# Patient Record
Sex: FEMALE | Race: WHITE | NOT HISPANIC OR LATINO | Employment: OTHER | URBAN - METROPOLITAN AREA
[De-identification: names, ages, dates, MRNs, and addresses within clinical notes are randomized per-mention and may not be internally consistent; named-entity substitution may affect disease eponyms.]

---

## 2021-11-19 ENCOUNTER — HISTORICAL (OUTPATIENT)
Dept: ADMINISTRATIVE | Facility: HOSPITAL | Age: 51
End: 2021-11-19

## 2021-11-19 LAB
ABS NEUT (OLG): 4.81 X10(3)/MCL (ref 2.1–9.2)
ALBUMIN SERPL-MCNC: 4 GM/DL (ref 3.5–5)
ALBUMIN/GLOB SERPL: 1.1 RATIO (ref 1.1–2)
ALP SERPL-CCNC: 95 UNIT/L (ref 40–150)
ALT SERPL-CCNC: 21 UNIT/L (ref 0–55)
APPEARANCE, UA: CLEAR
AST SERPL-CCNC: 21 UNIT/L (ref 5–34)
BACTERIA SPEC CULT: NORMAL /HPF
BASOPHILS # BLD AUTO: 0 X10(3)/MCL (ref 0–0.2)
BASOPHILS NFR BLD AUTO: 1 %
BILIRUB SERPL-MCNC: 1.4 MG/DL
BILIRUB UR QL STRIP: NEGATIVE
BILIRUBIN DIRECT+TOT PNL SERPL-MCNC: 0.5 MG/DL (ref 0–0.5)
BILIRUBIN DIRECT+TOT PNL SERPL-MCNC: 0.9 MG/DL (ref 0–0.8)
BNP BLD-MCNC: 159.9 PG/ML
BUN SERPL-MCNC: 17 MG/DL (ref 9.8–20.1)
CALCIUM SERPL-MCNC: 9.4 MG/DL (ref 8.7–10.5)
CHLORIDE SERPL-SCNC: 105 MMOL/L (ref 98–107)
CHOLEST SERPL-MCNC: 176 MG/DL
CHOLEST/HDLC SERPL: 4 {RATIO} (ref 0–5)
CO2 SERPL-SCNC: 29 MMOL/L (ref 22–29)
COLOR UR: YELLOW
CREAT SERPL-MCNC: 0.92 MG/DL (ref 0.55–1.02)
EOSINOPHIL # BLD AUTO: 0.4 X10(3)/MCL (ref 0–0.9)
EOSINOPHIL NFR BLD AUTO: 5 %
ERYTHROCYTE [DISTWIDTH] IN BLOOD BY AUTOMATED COUNT: 14.7 % (ref 11.5–17)
EST. AVERAGE GLUCOSE BLD GHB EST-MCNC: 154.2 MG/DL
GLOBULIN SER-MCNC: 3.7 GM/DL (ref 2.4–3.5)
GLUCOSE (UA): NEGATIVE
GLUCOSE SERPL-MCNC: 143 MG/DL (ref 74–100)
HBA1C MFR BLD: 7 %
HCT VFR BLD AUTO: 49.3 % (ref 37–47)
HDLC SERPL-MCNC: 46 MG/DL (ref 35–60)
HGB BLD-MCNC: 15.3 GM/DL (ref 12–16)
HGB UR QL STRIP: NEGATIVE
KETONES UR QL STRIP: NEGATIVE
LDLC SERPL CALC-MCNC: 104 MG/DL (ref 50–140)
LEUKOCYTE ESTERASE UR QL STRIP: NEGATIVE
LYMPHOCYTES # BLD AUTO: 1.5 X10(3)/MCL (ref 0.6–4.6)
LYMPHOCYTES NFR BLD AUTO: 22 %
MCH RBC QN AUTO: 30.1 PG (ref 27–31)
MCHC RBC AUTO-ENTMCNC: 31 GM/DL (ref 33–36)
MCV RBC AUTO: 96.9 FL (ref 80–94)
MONOCYTES # BLD AUTO: 0.4 X10(3)/MCL (ref 0.1–1.3)
MONOCYTES NFR BLD AUTO: 5 %
NEUTROPHILS # BLD AUTO: 4.81 X10(3)/MCL (ref 2.1–9.2)
NEUTROPHILS NFR BLD AUTO: 67 %
NITRITE UR QL STRIP: NEGATIVE
PH UR STRIP: 7 [PH] (ref 5–9)
PLATELET # BLD AUTO: 265 X10(3)/MCL (ref 130–400)
PMV BLD AUTO: 10.2 FL (ref 9.4–12.4)
POTASSIUM SERPL-SCNC: 4.7 MMOL/L (ref 3.5–5.1)
PROT SERPL-MCNC: 7.7 GM/DL (ref 6.4–8.3)
PROT UR QL STRIP: NEGATIVE
RBC # BLD AUTO: 5.09 X10(6)/MCL (ref 4.2–5.4)
RBC #/AREA URNS HPF: NORMAL /[HPF]
SODIUM SERPL-SCNC: 143 MMOL/L (ref 136–145)
SP GR UR STRIP: 1.01 (ref 1–1.03)
SQUAMOUS EPITHELIAL, UA: NORMAL /HPF (ref 0–4)
TRIGL SERPL-MCNC: 131 MG/DL (ref 37–140)
TSH SERPL-ACNC: 3.17 UIU/ML (ref 0.35–4.94)
UROBILINOGEN UR STRIP-ACNC: 0.2
VLDLC SERPL CALC-MCNC: 26 MG/DL
WBC # SPEC AUTO: 7.2 X10(3)/MCL (ref 4.5–11.5)
WBC #/AREA URNS HPF: NORMAL /[HPF]

## 2022-03-17 ENCOUNTER — HISTORICAL (OUTPATIENT)
Dept: ADMINISTRATIVE | Facility: HOSPITAL | Age: 52
End: 2022-03-17

## 2022-03-18 ENCOUNTER — HISTORICAL (OUTPATIENT)
Dept: RADIOLOGY | Facility: HOSPITAL | Age: 52
End: 2022-03-18

## 2022-03-18 ENCOUNTER — HISTORICAL (OUTPATIENT)
Dept: ADMINISTRATIVE | Facility: HOSPITAL | Age: 52
End: 2022-03-18

## 2022-03-21 ENCOUNTER — HISTORICAL (OUTPATIENT)
Dept: ADMINISTRATIVE | Facility: HOSPITAL | Age: 52
End: 2022-03-21

## 2022-03-21 LAB
ABS NEUT (OLG): 4.43 (ref 2.1–9.2)
ALBUMIN SERPL-MCNC: 3.9 G/DL (ref 3.5–5)
ALBUMIN/GLOB SERPL: 1 {RATIO} (ref 1.1–2)
ALP SERPL-CCNC: 87 U/L (ref 40–150)
ALT SERPL-CCNC: 21 U/L (ref 0–55)
APPEARANCE, UA: CLEAR
AST SERPL-CCNC: 19 U/L (ref 5–34)
BACTERIA SPEC CULT: NORMAL
BASOPHILS # BLD AUTO: 0 10*3/UL (ref 0–0.2)
BASOPHILS NFR BLD AUTO: 1 %
BILIRUB SERPL-MCNC: 1 MG/DL
BILIRUB UR QL STRIP: NEGATIVE
BILIRUBIN DIRECT+TOT PNL SERPL-MCNC: 0.3 (ref 0–0.5)
BILIRUBIN DIRECT+TOT PNL SERPL-MCNC: 0.7 (ref 0–0.8)
BUDDING YEAST: NORMAL
BUN SERPL-MCNC: 18 MG/DL (ref 9.8–20.1)
CALCIUM SERPL-MCNC: 9.1 MG/DL (ref 8.7–10.5)
CASTS, UA: NORMAL
CHLORIDE SERPL-SCNC: 105 MMOL/L (ref 98–107)
CO2 SERPL-SCNC: 24 MMOL/L (ref 22–29)
COLOR UR: YELLOW
CREAT SERPL-MCNC: 1 MG/DL (ref 0.55–1.02)
CREAT UR-MCNC: 15.2 MG/DL (ref 45–106)
CRYSTALS: NORMAL
EOSINOPHIL # BLD AUTO: 0.5 10*3/UL (ref 0–0.9)
EOSINOPHIL NFR BLD AUTO: 7 %
ERYTHROCYTE [DISTWIDTH] IN BLOOD BY AUTOMATED COUNT: 14.6 % (ref 11.5–17)
EST. AVERAGE GLUCOSE BLD GHB EST-MCNC: 174.3 MG/DL
GLOBULIN SER-MCNC: 3.8 G/DL (ref 2.4–3.5)
GLUCOSE (UA): NEGATIVE
GLUCOSE SERPL-MCNC: 143 MG/DL (ref 74–100)
HBA1C MFR BLD: 7.7 %
HCT VFR BLD AUTO: 47.1 % (ref 37–47)
HEMOLYSIS INTERF INDEX SERPL-ACNC: 6
HGB BLD-MCNC: 14.9 G/DL (ref 12–16)
HGB UR QL STRIP: NEGATIVE
ICTERIC INTERF INDEX SERPL-ACNC: 1
KETONES UR QL STRIP: NEGATIVE
LEUKOCYTE ESTERASE UR QL STRIP: NORMAL
LIPEMIC INTERF INDEX SERPL-ACNC: 3
LYMPHOCYTES # BLD AUTO: 2 10*3/UL (ref 0.6–4.6)
LYMPHOCYTES NFR BLD AUTO: 26 %
MANUAL DIFF? (OHS): NO
MCH RBC QN AUTO: 30.7 PG (ref 27–31)
MCHC RBC AUTO-ENTMCNC: 31.6 G/DL (ref 33–36)
MCV RBC AUTO: 97.1 FL (ref 80–94)
MICROALBUMIN UR-MCNC: <5
MICROALBUMIN/CREAT RATIO PNL UR: <32.9 (ref 0–30)
MONOCYTES # BLD AUTO: 0.5 10*3/UL (ref 0.1–1.3)
MONOCYTES NFR BLD AUTO: 6 %
NEUTROPHILS # BLD AUTO: 4.43 10*3/UL (ref 2.1–9.2)
NEUTROPHILS NFR BLD AUTO: 59 %
NITRITE UR QL STRIP: NEGATIVE
PH UR STRIP: 5 [PH] (ref 5–9)
PLATELET # BLD AUTO: 286 10*3/UL (ref 130–400)
PMV BLD AUTO: 10.6 FL (ref 9.4–12.4)
POTASSIUM SERPL-SCNC: 4.4 MMOL/L (ref 3.5–5.1)
PROT SERPL-MCNC: 7.7 G/DL (ref 6.4–8.3)
PROT UR QL STRIP: NEGATIVE
RBC # BLD AUTO: 4.85 10*6/UL (ref 4.2–5.4)
RBC #/AREA URNS HPF: NORMAL /[HPF] (ref 0–2)
SMALL ROUND CELLS, UA: NORMAL
SODIUM SERPL-SCNC: 138 MMOL/L (ref 136–145)
SP GR UR STRIP: 1.01 (ref 1–1.03)
SPERM URNS QL MICRO: NORMAL
SQUAMOUS EPITHELIAL, UA: NORMAL (ref 0–4)
UROBILINOGEN UR STRIP-ACNC: 0.2
WBC # SPEC AUTO: 7.5 10*3/UL (ref 4.5–11.5)
WBC #/AREA URNS HPF: NORMAL /[HPF] (ref 0–2)

## 2022-04-11 ENCOUNTER — HISTORICAL (OUTPATIENT)
Dept: ADMINISTRATIVE | Facility: HOSPITAL | Age: 52
End: 2022-04-11
Payer: MEDICAID

## 2022-04-27 VITALS
WEIGHT: 201.75 LBS | OXYGEN SATURATION: 94 % | BODY MASS INDEX: 37.13 KG/M2 | HEIGHT: 62 IN | DIASTOLIC BLOOD PRESSURE: 108 MMHG | SYSTOLIC BLOOD PRESSURE: 152 MMHG

## 2022-09-13 DIAGNOSIS — R91.1 LUNG NODULE: ICD-10-CM

## 2022-09-13 DIAGNOSIS — R59.0 HILAR LYMPHADENOPATHY: ICD-10-CM

## 2022-09-13 DIAGNOSIS — R91.1 SOLITARY PULMONARY NODULE: Primary | ICD-10-CM

## 2022-09-13 DIAGNOSIS — R59.0 MEDIASTINAL LYMPHADENOPATHY: ICD-10-CM

## 2022-09-19 ENCOUNTER — DOCUMENTATION ONLY (OUTPATIENT)
Dept: PULMONOLOGY | Facility: CLINIC | Age: 52
End: 2022-09-19
Payer: COMMERCIAL

## 2022-09-19 DIAGNOSIS — R59.0 MEDIASTINAL LYMPHADENOPATHY: Primary | ICD-10-CM

## 2022-09-19 DIAGNOSIS — R91.1 LUNG NODULE: ICD-10-CM

## 2022-09-19 DIAGNOSIS — R59.0 HILAR LYMPHADENOPATHY: ICD-10-CM

## 2022-09-19 DIAGNOSIS — R91.1 SOLITARY PULMONARY NODULE: ICD-10-CM

## 2022-09-19 NOTE — PROGRESS NOTES
Chest CT with contrast: CPT-33672  ICD 10-R59.9/R91.8  Prior Auth #[id number]  Valid:09/19/2022-10/18/2022    Notified [facility] PA Team of PA status.

## 2022-09-29 ENCOUNTER — HOSPITAL ENCOUNTER (OUTPATIENT)
Dept: RADIOLOGY | Facility: HOSPITAL | Age: 52
Discharge: HOME | End: 2022-09-29
Attending: INTERNAL MEDICINE
Payer: COMMERCIAL

## 2022-09-29 ENCOUNTER — TELEPHONE (OUTPATIENT)
Dept: PULMONOLOGY | Facility: CLINIC | Age: 52
End: 2022-09-29
Payer: COMMERCIAL

## 2022-09-29 DIAGNOSIS — R91.1 LUNG NODULE: ICD-10-CM

## 2022-09-29 DIAGNOSIS — R59.0 HILAR LYMPHADENOPATHY: ICD-10-CM

## 2022-09-29 DIAGNOSIS — R91.1 SOLITARY PULMONARY NODULE: ICD-10-CM

## 2022-09-29 DIAGNOSIS — R59.0 MEDIASTINAL LYMPHADENOPATHY: ICD-10-CM

## 2022-09-29 LAB
CREAT SERPL-MCNC: 1.16 MG/DL (ref 0.55–1.02)
GFR SERPLBLD CREATININE-BSD FMLA CKD-EPI: 57 MLS/MIN/1.73/M2

## 2022-09-29 PROCEDURE — 25500020 PHARM REV CODE 255

## 2022-09-29 PROCEDURE — 71260 CT THORAX DX C+: CPT | Mod: TC

## 2022-09-29 PROCEDURE — 82565 ASSAY OF CREATININE: CPT | Performed by: INTERNAL MEDICINE

## 2022-09-29 PROCEDURE — 36415 COLL VENOUS BLD VENIPUNCTURE: CPT | Performed by: INTERNAL MEDICINE

## 2022-09-29 RX ADMIN — IOPAMIDOL 100 ML: 755 INJECTION, SOLUTION INTRAVENOUS at 08:09

## 2022-09-30 RX ORDER — POTASSIUM CHLORIDE 20 MEQ/1
20 TABLET, EXTENDED RELEASE ORAL DAILY
COMMUNITY
End: 2022-10-10 | Stop reason: ALTCHOICE

## 2022-09-30 RX ORDER — ATORVASTATIN CALCIUM 20 MG/1
20 TABLET, FILM COATED ORAL DAILY
COMMUNITY
Start: 2021-12-02 | End: 2022-10-10 | Stop reason: SDUPTHER

## 2022-09-30 RX ORDER — FLUOXETINE 10 MG/1
10 CAPSULE ORAL DAILY
COMMUNITY
End: 2022-10-31 | Stop reason: SDUPTHER

## 2022-09-30 RX ORDER — METFORMIN HYDROCHLORIDE 500 MG/1
2 TABLET ORAL 2 TIMES DAILY WITH MEALS
COMMUNITY
End: 2022-10-10 | Stop reason: ALTCHOICE

## 2022-09-30 RX ORDER — ASPIRIN 81 MG/1
81 TABLET ORAL DAILY
COMMUNITY
Start: 2021-12-02

## 2022-09-30 RX ORDER — CARVEDILOL 6.25 MG/1
25 TABLET ORAL 2 TIMES DAILY
COMMUNITY
Start: 2021-11-18 | End: 2022-10-10 | Stop reason: SDUPTHER

## 2022-09-30 RX ORDER — FLUTICASONE PROPIONATE 50 UG/1
1 POWDER, METERED RESPIRATORY (INHALATION) DAILY
COMMUNITY
End: 2022-10-12

## 2022-09-30 RX ORDER — ALBUTEROL SULFATE 90 UG/1
2 AEROSOL, METERED RESPIRATORY (INHALATION) EVERY 4 HOURS PRN
COMMUNITY

## 2022-09-30 RX ORDER — LOSARTAN POTASSIUM 100 MG/1
100 TABLET ORAL DAILY
COMMUNITY
Start: 2021-12-02 | End: 2022-10-10 | Stop reason: ALTCHOICE

## 2022-09-30 RX ORDER — FUROSEMIDE 20 MG/1
20 TABLET ORAL DAILY
COMMUNITY
Start: 2021-10-18 | End: 2022-10-10 | Stop reason: SDUPTHER

## 2022-09-30 RX ORDER — BUDESONIDE AND FORMOTEROL FUMARATE DIHYDRATE 80; 4.5 UG/1; UG/1
2 AEROSOL RESPIRATORY (INHALATION) 2 TIMES DAILY
COMMUNITY
End: 2022-10-12

## 2022-10-03 ENCOUNTER — OFFICE VISIT (OUTPATIENT)
Dept: PULMONOLOGY | Facility: CLINIC | Age: 52
End: 2022-10-03
Payer: COMMERCIAL

## 2022-10-03 ENCOUNTER — TELEPHONE (OUTPATIENT)
Dept: PULMONOLOGY | Facility: CLINIC | Age: 52
End: 2022-10-03

## 2022-10-03 VITALS
OXYGEN SATURATION: 92 % | WEIGHT: 207 LBS | BODY MASS INDEX: 38.09 KG/M2 | HEART RATE: 69 BPM | RESPIRATION RATE: 20 BRPM | DIASTOLIC BLOOD PRESSURE: 74 MMHG | HEIGHT: 62 IN | TEMPERATURE: 98 F | SYSTOLIC BLOOD PRESSURE: 122 MMHG

## 2022-10-03 DIAGNOSIS — R91.8 PULMONARY NODULES: Primary | ICD-10-CM

## 2022-10-03 DIAGNOSIS — R59.0 HILAR LYMPHADENOPATHY: ICD-10-CM

## 2022-10-03 DIAGNOSIS — R59.0 MEDIASTINAL LYMPHADENOPATHY: ICD-10-CM

## 2022-10-03 DIAGNOSIS — R91.1 SOLITARY PULMONARY NODULE: ICD-10-CM

## 2022-10-03 PROCEDURE — 3074F SYST BP LT 130 MM HG: CPT | Mod: CPTII,,, | Performed by: INTERNAL MEDICINE

## 2022-10-03 PROCEDURE — 1159F MED LIST DOCD IN RCRD: CPT | Mod: CPTII,,, | Performed by: INTERNAL MEDICINE

## 2022-10-03 PROCEDURE — 99213 PR OFFICE/OUTPT VISIT, EST, LEVL III, 20-29 MIN: ICD-10-PCS | Mod: S$PBB,,, | Performed by: INTERNAL MEDICINE

## 2022-10-03 PROCEDURE — 3052F PR MOST RECENT HEMOGLOBIN A1C LEVEL 8.0 - < 9.0%: ICD-10-PCS | Mod: CPTII,,, | Performed by: INTERNAL MEDICINE

## 2022-10-03 PROCEDURE — 3008F PR BODY MASS INDEX (BMI) DOCUMENTED: ICD-10-PCS | Mod: CPTII,,, | Performed by: INTERNAL MEDICINE

## 2022-10-03 PROCEDURE — 3008F BODY MASS INDEX DOCD: CPT | Mod: CPTII,,, | Performed by: INTERNAL MEDICINE

## 2022-10-03 PROCEDURE — 4010F ACE/ARB THERAPY RXD/TAKEN: CPT | Mod: CPTII,,, | Performed by: INTERNAL MEDICINE

## 2022-10-03 PROCEDURE — 4010F PR ACE/ARB THEARPY RXD/TAKEN: ICD-10-PCS | Mod: CPTII,,, | Performed by: INTERNAL MEDICINE

## 2022-10-03 PROCEDURE — 3074F PR MOST RECENT SYSTOLIC BLOOD PRESSURE < 130 MM HG: ICD-10-PCS | Mod: CPTII,,, | Performed by: INTERNAL MEDICINE

## 2022-10-03 PROCEDURE — 3078F PR MOST RECENT DIASTOLIC BLOOD PRESSURE < 80 MM HG: ICD-10-PCS | Mod: CPTII,,, | Performed by: INTERNAL MEDICINE

## 2022-10-03 PROCEDURE — 1160F PR REVIEW ALL MEDS BY PRESCRIBER/CLIN PHARMACIST DOCUMENTED: ICD-10-PCS | Mod: CPTII,,, | Performed by: INTERNAL MEDICINE

## 2022-10-03 PROCEDURE — 3078F DIAST BP <80 MM HG: CPT | Mod: CPTII,,, | Performed by: INTERNAL MEDICINE

## 2022-10-03 PROCEDURE — 3066F PR DOCUMENTATION OF TREATMENT FOR NEPHROPATHY: ICD-10-PCS | Mod: CPTII,,, | Performed by: INTERNAL MEDICINE

## 2022-10-03 PROCEDURE — 3061F NEG MICROALBUMINURIA REV: CPT | Mod: CPTII,,, | Performed by: INTERNAL MEDICINE

## 2022-10-03 PROCEDURE — 99213 OFFICE O/P EST LOW 20 MIN: CPT | Mod: S$PBB,,, | Performed by: INTERNAL MEDICINE

## 2022-10-03 PROCEDURE — 99214 OFFICE O/P EST MOD 30 MIN: CPT | Mod: PBBFAC

## 2022-10-03 PROCEDURE — 3052F HG A1C>EQUAL 8.0%<EQUAL 9.0%: CPT | Mod: CPTII,,, | Performed by: INTERNAL MEDICINE

## 2022-10-03 PROCEDURE — 1160F RVW MEDS BY RX/DR IN RCRD: CPT | Mod: CPTII,,, | Performed by: INTERNAL MEDICINE

## 2022-10-03 PROCEDURE — 1159F PR MEDICATION LIST DOCUMENTED IN MEDICAL RECORD: ICD-10-PCS | Mod: CPTII,,, | Performed by: INTERNAL MEDICINE

## 2022-10-03 PROCEDURE — 3061F PR NEG MICROALBUMINURIA RESULT DOCUMENTED/REVIEW: ICD-10-PCS | Mod: CPTII,,, | Performed by: INTERNAL MEDICINE

## 2022-10-03 PROCEDURE — 3066F NEPHROPATHY DOC TX: CPT | Mod: CPTII,,, | Performed by: INTERNAL MEDICINE

## 2022-10-03 RX ORDER — CARVEDILOL 25 MG/1
25 TABLET ORAL 2 TIMES DAILY WITH MEALS
COMMUNITY
End: 2022-10-10 | Stop reason: SDUPTHER

## 2022-10-03 RX ORDER — DAPAGLIFLOZIN 10 MG/1
10 TABLET, FILM COATED ORAL DAILY
COMMUNITY
End: 2022-10-10 | Stop reason: ALTCHOICE

## 2022-10-03 RX ORDER — AMLODIPINE BESYLATE 5 MG/1
5 TABLET ORAL DAILY
COMMUNITY
End: 2022-10-10 | Stop reason: SDUPTHER

## 2022-10-03 RX ORDER — PANTOPRAZOLE SODIUM 20 MG/1
20 TABLET, DELAYED RELEASE ORAL DAILY
COMMUNITY
End: 2022-10-10 | Stop reason: SDUPTHER

## 2022-10-03 RX ORDER — OLMESARTAN MEDOXOMIL 20 MG/1
40 TABLET ORAL DAILY
COMMUNITY
End: 2022-10-10 | Stop reason: SDUPTHER

## 2022-10-03 RX ORDER — GLIPIZIDE 5 MG/1
5 TABLET, FILM COATED, EXTENDED RELEASE ORAL
COMMUNITY
End: 2022-10-10 | Stop reason: ALTCHOICE

## 2022-10-03 NOTE — PROGRESS NOTES
"Subjective:       Patient ID: Kirstie Christianson is a 51 y.o. female.    Chief Complaint: No chief complaint on file.    HPI  51 y.o. F here for initial evaluation in [facility] for lung nodules. Imaging from 9/22 shows 5mm nodule and additional sub-centimeter nodules(negligible in size compared to 5mm).  Patient had prior imaging in March which had shown a similar finding.  Scans also incidentally noted some ground-glass opacities scattered about which looked more like edema and this is consistent with her history of heart failure.  There was some trace lymphadenopathy but that is also unchanged.  Patient does complain of dyspnea on exertion and orthopnea which is highly likely to represent heart failure symptoms.  Objective:   /74 (BP Location: Left arm, Patient Position: Sitting, BP Method: Large (Automatic))   Pulse 69   Temp 98.4 °F (36.9 °C)   Resp 20   Ht 5' 2" (1.575 m)   Wt 93.9 kg (207 lb)   SpO2 (!) 92% Comment: on room air  BMI 37.86 kg/m²     GENERAL: NAD, A & 0 X 3, calm  CARDIAC: RRR, NO m/g/r  PULM: CTA BL, No wheezing.  Faint rales heard in the bases  ABD: NT/ND/S. .  Bowel sounds heard  EXT:  Mild cyanosis noted, no clubbing, there is trace edema, peripheral pulses intact  NEURO: no obvious neurosensory deficits, no dysarthria noted  PSYCH: no agitation or anxiety   EYES: tracks examiner around room, pupil reactive to light  NECK: trachea midline, no obvious JVD    ROS:  General: no fevers or chills reported  Endocrine: denies polyuria or polydipsia. Denies weight loss  Card:denies CP or palpitations. Denies jaw pain  Neuro: denies hemiparesis or facial droop. Denies dysarthia  Ext: no cyanosis, clubbing, or edema  Resp: see HPI  Eyes: denies discharge, changes in vision, or pain with eye movements  Psychiatric: denies suicidal thoughts or depression  GI: denies nausea, vomiting, or diarhea    Current Outpatient Medications   Medication Instructions    albuterol (PROVENTIL/VENTOLIN HFA) 90 " mcg/actuation inhaler 2 puffs, Inhalation, Every 4 hours PRN, Rescue    amLODIPine (NORVASC) 5 mg, Oral, Daily    aspirin (ECOTRIN) 81 mg, Oral, Daily    atorvastatin (LIPITOR) 20 mg, Oral, Daily    budesonide-formoterol 80-4.5 mcg (SYMBICORT) 80-4.5 mcg/actuation HFAA 2 puffs, Inhalation, 2 times daily, Controller    carvediloL (COREG) 25 mg, Oral, 2 times daily    carvediloL (COREG) 25 mg, Oral, 2 times daily with meals    FARXIGA 10 mg, Oral, Daily    FLUoxetine 10 mg, Oral, Daily    fluticasone propionate (FLOVENT DISKUS) 50 mcg/actuation DsDv 1 spray, Inhalation, Daily, One spray each nare daily    furosemide (LASIX) 20 mg, Oral, Daily    glipiZIDE 5 mg, Oral, With breakfast    losartan (COZAAR) 100 mg, Oral, Daily    metFORMIN (GLUCOPHAGE) 500 MG tablet 2 tablets, Oral, 2 times daily with meals, Take two tablets twice daily    olmesartan (BENICAR) 40 mg, Oral, Daily    pantoprazole (PROTONIX) 20 mg, Oral, Daily    potassium chloride SA (K-DUR,KLOR-CON, K-TAB) 20 MEQ tablet 20 mEq, Oral, Daily   CLINICAL HISTORY:  Lung nodule, < 6mm, high cancer risk;Mediastinal and hilar lymphadenopathy; Localized enlarged lymph nodes     TECHNIQUE:  Low dose axial images, sagittal and coronal reformations were obtained from the thoracic inlet to the lung bases. Contrast was  administered.  Automatic exposure control is utilized to reduce patient radiation exposure.     COMPARISON:  03/17/2022     FINDINGS:  There is some ground-glass interstitial infiltrates seen in the lungs in the upper and lower lobes.  There is a nodule seen along the major fissure on the right side which is stable since the prior examination.  It measures 5 mm.  No pleural effusion is seen.  No consolidation is seen.  No enhancing lesion is seen.  There is some subcentimeter lymph nodes in the mediastinum which were seen on the prior examination as well and appear unchanged.  Thoracic aorta appears normal.  Heart appears normal.     Upper abdomen shows  evidence of hepatomegaly and hepatic steatosis.     Impression:     Stable nodule along the major fissure on the right side     Interval development of diffuse ground-glass interstitial infiltrates with no consolidation     Stable mediastinal lymphadenopathy most likely reactive     Hepatic steatosis and hepatomegaly       Assessment:       Pulmonary nodules      Plan:     The dominant nodule in question on the right measures between 5 and 6 mm and has been radiographically stable for 6 months.  I believe that this represents a benign process as she is low risk however it still deserves subsequent follow-up.  We will obtain a repeat CT scan in 1 year and have her follow-up than    Ilir Herrera MD

## 2022-10-03 NOTE — TELEPHONE ENCOUNTER
I called Ms. Christianson's sister and let her know the date and time of her appt with Dr. Brambila to get established.  She verbalized understanding/tdr

## 2022-10-10 ENCOUNTER — OFFICE VISIT (OUTPATIENT)
Dept: PRIMARY CARE CLINIC | Facility: CLINIC | Age: 52
End: 2022-10-10
Payer: COMMERCIAL

## 2022-10-10 VITALS
SYSTOLIC BLOOD PRESSURE: 136 MMHG | WEIGHT: 211 LBS | RESPIRATION RATE: 20 BRPM | HEART RATE: 68 BPM | HEIGHT: 62 IN | BODY MASS INDEX: 38.83 KG/M2 | TEMPERATURE: 98 F | DIASTOLIC BLOOD PRESSURE: 78 MMHG | OXYGEN SATURATION: 91 %

## 2022-10-10 DIAGNOSIS — F41.1 GAD (GENERALIZED ANXIETY DISORDER): ICD-10-CM

## 2022-10-10 DIAGNOSIS — M54.16 LUMBAR BACK PAIN WITH RADICULOPATHY AFFECTING LOWER EXTREMITY: ICD-10-CM

## 2022-10-10 DIAGNOSIS — E66.01 CLASS 2 SEVERE OBESITY WITH SERIOUS COMORBIDITY AND BODY MASS INDEX (BMI) OF 37.0 TO 37.9 IN ADULT, UNSPECIFIED OBESITY TYPE: ICD-10-CM

## 2022-10-10 DIAGNOSIS — I10 PRIMARY HYPERTENSION: ICD-10-CM

## 2022-10-10 DIAGNOSIS — E11.59 TYPE 2 DIABETES MELLITUS WITH OTHER CIRCULATORY COMPLICATION, WITHOUT LONG-TERM CURRENT USE OF INSULIN: ICD-10-CM

## 2022-10-10 DIAGNOSIS — K21.9 GASTROESOPHAGEAL REFLUX DISEASE, UNSPECIFIED WHETHER ESOPHAGITIS PRESENT: ICD-10-CM

## 2022-10-10 DIAGNOSIS — J30.2 SEASONAL ALLERGIC RHINITIS, UNSPECIFIED TRIGGER: ICD-10-CM

## 2022-10-10 DIAGNOSIS — E11.43 TYPE II DIABETES MELLITUS WITH PERIPHERAL AUTONOMIC NEUROPATHY: ICD-10-CM

## 2022-10-10 DIAGNOSIS — E78.2 MIXED HYPERLIPIDEMIA: ICD-10-CM

## 2022-10-10 DIAGNOSIS — I50.20 HEART FAILURE WITH REDUCED EJECTION FRACTION: ICD-10-CM

## 2022-10-10 DIAGNOSIS — Z76.89 ENCOUNTER TO ESTABLISH CARE WITH NEW DOCTOR: Primary | ICD-10-CM

## 2022-10-10 PROBLEM — E66.812 CLASS 2 SEVERE OBESITY WITH SERIOUS COMORBIDITY AND BODY MASS INDEX (BMI) OF 37.0 TO 37.9 IN ADULT: Status: ACTIVE | Noted: 2022-10-10

## 2022-10-10 PROBLEM — E11.3299 MILD NONPROLIFERATIVE DIABETIC RETINOPATHY WITHOUT MACULAR EDEMA ASSOCIATED WITH TYPE 2 DIABETES MELLITUS: Status: ACTIVE | Noted: 2022-10-10

## 2022-10-10 PROBLEM — E11.65 TYPE 2 DIABETES MELLITUS WITH HYPERGLYCEMIA, WITHOUT LONG-TERM CURRENT USE OF INSULIN: Status: ACTIVE | Noted: 2022-10-10

## 2022-10-10 PROBLEM — I50.9 ACC/AHA STAGE C CONGESTIVE HEART FAILURE: Status: ACTIVE | Noted: 2022-10-10

## 2022-10-10 PROBLEM — R91.1 PULMONARY NODULE: Status: ACTIVE | Noted: 2022-10-10

## 2022-10-10 PROBLEM — E66.9 OBESITY: Status: ACTIVE | Noted: 2022-10-10

## 2022-10-10 PROCEDURE — 4010F PR ACE/ARB THEARPY RXD/TAKEN: ICD-10-PCS | Mod: CPTII,,, | Performed by: STUDENT IN AN ORGANIZED HEALTH CARE EDUCATION/TRAINING PROGRAM

## 2022-10-10 PROCEDURE — 3008F PR BODY MASS INDEX (BMI) DOCUMENTED: ICD-10-PCS | Mod: CPTII,,, | Performed by: STUDENT IN AN ORGANIZED HEALTH CARE EDUCATION/TRAINING PROGRAM

## 2022-10-10 PROCEDURE — 3061F NEG MICROALBUMINURIA REV: CPT | Mod: CPTII,,, | Performed by: STUDENT IN AN ORGANIZED HEALTH CARE EDUCATION/TRAINING PROGRAM

## 2022-10-10 PROCEDURE — 3075F PR MOST RECENT SYSTOLIC BLOOD PRESS GE 130-139MM HG: ICD-10-PCS | Mod: CPTII,,, | Performed by: STUDENT IN AN ORGANIZED HEALTH CARE EDUCATION/TRAINING PROGRAM

## 2022-10-10 PROCEDURE — 1160F PR REVIEW ALL MEDS BY PRESCRIBER/CLIN PHARMACIST DOCUMENTED: ICD-10-PCS | Mod: CPTII,,, | Performed by: STUDENT IN AN ORGANIZED HEALTH CARE EDUCATION/TRAINING PROGRAM

## 2022-10-10 PROCEDURE — 99417 PR PROLONGED SVC, OUTPT, W/WO DIRECT PT CONTACT,  EA ADDTL 15 MIN: ICD-10-PCS | Mod: ,,, | Performed by: STUDENT IN AN ORGANIZED HEALTH CARE EDUCATION/TRAINING PROGRAM

## 2022-10-10 PROCEDURE — 3066F PR DOCUMENTATION OF TREATMENT FOR NEPHROPATHY: ICD-10-PCS | Mod: CPTII,,, | Performed by: STUDENT IN AN ORGANIZED HEALTH CARE EDUCATION/TRAINING PROGRAM

## 2022-10-10 PROCEDURE — 3052F HG A1C>EQUAL 8.0%<EQUAL 9.0%: CPT | Mod: CPTII,,, | Performed by: STUDENT IN AN ORGANIZED HEALTH CARE EDUCATION/TRAINING PROGRAM

## 2022-10-10 PROCEDURE — 1159F MED LIST DOCD IN RCRD: CPT | Mod: CPTII,,, | Performed by: STUDENT IN AN ORGANIZED HEALTH CARE EDUCATION/TRAINING PROGRAM

## 2022-10-10 PROCEDURE — 99205 OFFICE O/P NEW HI 60 MIN: CPT | Mod: ,,, | Performed by: STUDENT IN AN ORGANIZED HEALTH CARE EDUCATION/TRAINING PROGRAM

## 2022-10-10 PROCEDURE — 3061F PR NEG MICROALBUMINURIA RESULT DOCUMENTED/REVIEW: ICD-10-PCS | Mod: CPTII,,, | Performed by: STUDENT IN AN ORGANIZED HEALTH CARE EDUCATION/TRAINING PROGRAM

## 2022-10-10 PROCEDURE — 1160F RVW MEDS BY RX/DR IN RCRD: CPT | Mod: CPTII,,, | Performed by: STUDENT IN AN ORGANIZED HEALTH CARE EDUCATION/TRAINING PROGRAM

## 2022-10-10 PROCEDURE — 3078F PR MOST RECENT DIASTOLIC BLOOD PRESSURE < 80 MM HG: ICD-10-PCS | Mod: CPTII,,, | Performed by: STUDENT IN AN ORGANIZED HEALTH CARE EDUCATION/TRAINING PROGRAM

## 2022-10-10 PROCEDURE — 4010F ACE/ARB THERAPY RXD/TAKEN: CPT | Mod: CPTII,,, | Performed by: STUDENT IN AN ORGANIZED HEALTH CARE EDUCATION/TRAINING PROGRAM

## 2022-10-10 PROCEDURE — 3075F SYST BP GE 130 - 139MM HG: CPT | Mod: CPTII,,, | Performed by: STUDENT IN AN ORGANIZED HEALTH CARE EDUCATION/TRAINING PROGRAM

## 2022-10-10 PROCEDURE — 3052F PR MOST RECENT HEMOGLOBIN A1C LEVEL 8.0 - < 9.0%: ICD-10-PCS | Mod: CPTII,,, | Performed by: STUDENT IN AN ORGANIZED HEALTH CARE EDUCATION/TRAINING PROGRAM

## 2022-10-10 PROCEDURE — 3008F BODY MASS INDEX DOCD: CPT | Mod: CPTII,,, | Performed by: STUDENT IN AN ORGANIZED HEALTH CARE EDUCATION/TRAINING PROGRAM

## 2022-10-10 PROCEDURE — 99417 PROLNG OP E/M EACH 15 MIN: CPT | Mod: ,,, | Performed by: STUDENT IN AN ORGANIZED HEALTH CARE EDUCATION/TRAINING PROGRAM

## 2022-10-10 PROCEDURE — 3078F DIAST BP <80 MM HG: CPT | Mod: CPTII,,, | Performed by: STUDENT IN AN ORGANIZED HEALTH CARE EDUCATION/TRAINING PROGRAM

## 2022-10-10 PROCEDURE — 99205 PR OFFICE/OUTPT VISIT, NEW, LEVL V, 60-74 MIN: ICD-10-PCS | Mod: ,,, | Performed by: STUDENT IN AN ORGANIZED HEALTH CARE EDUCATION/TRAINING PROGRAM

## 2022-10-10 PROCEDURE — 1159F PR MEDICATION LIST DOCUMENTED IN MEDICAL RECORD: ICD-10-PCS | Mod: CPTII,,, | Performed by: STUDENT IN AN ORGANIZED HEALTH CARE EDUCATION/TRAINING PROGRAM

## 2022-10-10 PROCEDURE — 3066F NEPHROPATHY DOC TX: CPT | Mod: CPTII,,, | Performed by: STUDENT IN AN ORGANIZED HEALTH CARE EDUCATION/TRAINING PROGRAM

## 2022-10-10 RX ORDER — ATORVASTATIN CALCIUM 40 MG/1
40 TABLET, FILM COATED ORAL DAILY
Qty: 90 TABLET | Refills: 3 | Status: SHIPPED | OUTPATIENT
Start: 2022-10-10 | End: 2023-01-10

## 2022-10-10 RX ORDER — OLMESARTAN MEDOXOMIL 20 MG/1
40 TABLET ORAL DAILY
Qty: 30 TABLET | Refills: 2 | Status: SHIPPED | OUTPATIENT
Start: 2022-10-10 | End: 2023-04-25 | Stop reason: SDUPTHER

## 2022-10-10 RX ORDER — FUROSEMIDE 20 MG/1
20 TABLET ORAL DAILY
Qty: 30 TABLET | Refills: 2 | Status: SHIPPED | OUTPATIENT
Start: 2022-10-10 | End: 2023-09-05 | Stop reason: SDUPTHER

## 2022-10-10 RX ORDER — CARVEDILOL 25 MG/1
25 TABLET ORAL 2 TIMES DAILY WITH MEALS
Qty: 30 TABLET | Refills: 2 | Status: SHIPPED | OUTPATIENT
Start: 2022-10-10 | End: 2022-11-29 | Stop reason: SDUPTHER

## 2022-10-10 RX ORDER — PANTOPRAZOLE SODIUM 20 MG/1
20 TABLET, DELAYED RELEASE ORAL DAILY
Qty: 90 TABLET | Refills: 3 | Status: SHIPPED | OUTPATIENT
Start: 2022-10-10 | End: 2023-06-20 | Stop reason: SDUPTHER

## 2022-10-10 RX ORDER — AMLODIPINE BESYLATE 5 MG/1
5 TABLET ORAL DAILY
Qty: 30 TABLET | Refills: 2 | Status: SHIPPED | OUTPATIENT
Start: 2022-10-10 | End: 2022-12-09 | Stop reason: SDUPTHER

## 2022-10-10 RX ORDER — GABAPENTIN 300 MG/1
300 CAPSULE ORAL 2 TIMES DAILY
Qty: 60 CAPSULE | Refills: 11 | Status: SHIPPED | OUTPATIENT
Start: 2022-10-10 | End: 2023-02-27 | Stop reason: SDUPTHER

## 2022-10-10 RX ORDER — EMPAGLIFLOZIN, METFORMIN HYDROCHLORIDE 12.5; 1 MG/1; MG/1
1 TABLET, EXTENDED RELEASE ORAL DAILY
Qty: 90 TABLET | Refills: 3 | Status: SHIPPED | OUTPATIENT
Start: 2022-10-10 | End: 2022-10-31 | Stop reason: SDUPTHER

## 2022-10-10 RX ORDER — FLUTICASONE PROPIONATE 50 MCG
2 SPRAY, SUSPENSION (ML) NASAL 2 TIMES DAILY
Qty: 11.1 ML | Refills: 3 | Status: SHIPPED | OUTPATIENT
Start: 2022-10-10 | End: 2023-10-30 | Stop reason: SDUPTHER

## 2022-10-10 NOTE — LETTER
AUTHORIZATION FOR RELEASE OF   CONFIDENTIAL INFORMATION    Dear Dr. Galeano,    We are seeing Kirstie Christianson, date of birth 1970, in the clinic at [facility]. Marie Brambila MD is the patient's PCP. Kirstie Christianson has an outstanding lab/procedure at the time we reviewed her chart. In order to help keep her health information updated, she has authorized us to request the following medical record(s):        (  )  MAMMOGRAM                                      (  )  COLONOSCOPY      (  )  PAP SMEAR                                          ( x )  OUTSIDE LAB RESULTS     (  )  DEXA SCAN                                          (  )  EYE EXAM            (  )  FOOT EXAM                                          (  )  ENTIRE RECORD     (  )  OUTSIDE IMMUNIZATIONS                 ( x )  _last 3 OVN, any imaging or other test_         Please fax records to [facility], Madelaine Fontenot, MD, [phone]     If you have any questions, please contact us at [phone].           Patient Name: Kirstie Christianson  : 1970  Patient Phone #: [phone]

## 2022-10-10 NOTE — PROGRESS NOTES
"Subjective:       Patient ID: Kirstie Christianson is a 51 y.o. female.    Past Medical History:   CHF (NYHA class III, ACC/AHA Stage C)  Diabetes Mellitus II  Disorder of kidney and ureter  Hilar lymphadenopathy  Obesity, unspecified  Pulmonary nodule  Systolic heart failure     Chief Complaint: Establish Care, refer by Dr DEEPTI Herrera -pulmonary " lung nodule", Dr Olivier 6 mos ago -negative angiogram (C/o SOB , sleeps with 2 pillows), mammogram 05/2022, needs pap - ob/gyn referral , c/o fatigue, and lab review "labs drawn 09/29/2022"    Presents to the clinic to establish care. Good friend (nurse), and  is also present for the visit. Recently was seen at [facility] pulmonary clinic for pulmonary nodules, likely benign will just monitor at this time. Suspect that her SOB, MURDOCK, Orthopnea (sleeps on 2 pillows, unable to lay flat) is more cardiac related then pulmonary. Saw Dr. Galeano over 6 months ago, would like a referral to another cardiologist in town. Unable to walk more than 250 ft without becoming winded. Improves with rest. Denies chest pain, syncope. Has tried inhalers in the past, but stopped using them because it showed no improvement of her symptoms. Endorsed 100% compliance with her medications. Checks her weight, sugar and BP daily. Reviewed logs with patient. Needs referral for pap smear. Had labs recently done with previous PCP, never heard the results. Showed A1c of 7.5 and stable mild improvement of Creatinine. Needs medication refills. Also endorsed numbness/tingling of hands and feet as well as lower back pain. Back pain worsens with prolonged standing/walking. ROS also revealed abdominal and leg swelling episodically.     Review of Systems   Constitutional:  Positive for fatigue. Negative for chills and fever.   Respiratory:  Positive for shortness of breath. Negative for cough and wheezing.    Cardiovascular:  Positive for leg swelling. Negative for chest pain and palpitations.        MURDOCK, Orthopnea, " PND    Gastrointestinal:  Negative for abdominal pain, constipation, diarrhea and nausea.   Genitourinary:  Negative for dysuria, hematuria and urgency.   Musculoskeletal:  Positive for back pain and leg pain.   Neurological:  Positive for numbness. Negative for syncope and light-headedness.   Psychiatric/Behavioral:  Negative for dysphoric mood and sleep disturbance. The patient is not nervous/anxious.        Objective:      Physical Exam  Vitals and nursing note reviewed.   Constitutional:       General: She is not in acute distress.     Appearance: Normal appearance. She is not ill-appearing.   HENT:      Mouth/Throat:      Mouth: Mucous membranes are moist.   Eyes:      General: No scleral icterus.     Extraocular Movements: Extraocular movements intact.      Conjunctiva/sclera: Conjunctivae normal.      Pupils: Pupils are equal, round, and reactive to light.   Cardiovascular:      Rate and Rhythm: Normal rate and regular rhythm.      Pulses: Normal pulses.      Heart sounds: Normal heart sounds. No murmur heard.  Pulmonary:      Effort: Pulmonary effort is normal. No respiratory distress.      Breath sounds: Normal breath sounds. No wheezing.   Abdominal:      General: There is no distension.      Palpations: Abdomen is soft.      Tenderness: There is no abdominal tenderness.   Musculoskeletal:      Right lower leg: Edema present.      Left lower leg: Edema present.   Skin:     General: Skin is warm.      Coloration: Skin is not jaundiced.   Neurological:      Mental Status: She is alert. Mental status is at baseline.      Gait: Gait normal.   Psychiatric:         Mood and Affect: Mood normal.         Behavior: Behavior normal.       Assessment & Plan:   1. Encounter to establish care with new doctor  Comments:  Reviewed medical history and reconciled medications   Requested records from previous provider/specialists   Reviewed recent labs/records in the system   Orders:  -     Ambulatory referral/consult to  Obstetrics / Gynecology; Future; Expected date: 10/19/2022    2. Primary hypertension  Assessment & Plan:  Today's BP WNL   Continue Coreg, Olmesartan, Norvasc, Lasix   Counseled on low sodium diet, exercise, tobacco avoidance, and limiting alcohol intake   Pt. Has home BP cuff, instructed to measure home BP and report abnormal values      3. Class 2 severe obesity with serious comorbidity and body mass index (BMI) of 37.0 to 37.9 in adult, unspecified obesity type  Assessment & Plan:  Encouraged healthy lifestyle/diet modifications   Exercise 3-5x a week (>30 minutes, including a variety of cardio, weightbearing and lifting exercises)   Eat a well balanced diet comprised of fruit/vegetables, lean protein, and complex carbs  Start Trulicity weekly injection for both glycemic/weight control    4. Type 2 diabetes mellitus with other circulatory complication, without long-term current use of insulin  Assessment & Plan:  Most recent A1c was 7.5, goal A1c <7.0%   Discontinue Glipizide, metformin and farxiga   Start Synjardy XR, and Trulicity   Continue ACE-I and Statin   Continue ADA diet, Counseled on importance of diet & weight loss   Referral placed for DM education     5. Heart failure with reduced ejection fraction  Assessment & Plan:  Non-ischemic Cardiomyopathy, [facility] negative in 2021  Echocardiogram in 2021 showed EF of 30-35%, needs repeat  Still symptomatic - MURDOCK, Orthopnea, Swelling  Continue Olmesartan, Coreg, Lasix (patient may be a good candidate for Entresto, defer to Cardiology)   Requested records from Dr. Galeano  Sent referral to [facility], patient would like to switch cardiology at this time, needs further optimization, doesn't appears to be a baseline. Would benefit from Cardiac Rehab and HF education.  Started Synjardy XR for both DM and HF control/benefit   Weigh self daily - if 2-3lbs in 24hrs or 5lbs in 1 week, take extra dose of lasix   If symptoms don't improve after optimization of HF, then consider  further pulmonary workup/sleep disorder referral for LASHANDA     6. Mixed hyperlipidemia  Assessment & Plan:  Last Lipid Panel WNL  ASCVD calculated 10 year risk -moderate to high   Increase to 40mg of Lipitor, resent prescription   Counseled on dietary modifications  Counseled to exercise 150 minutes weekly as exercise raises HDL and lowers LDL     7. Gastroesophageal reflux disease, unspecified whether esophagitis present  Assessment & Plan:  Stable, continue protonix 20mg daily, refilled  Encouraged lifestyle modifications (sit upright for >30 mins after meals, do not eat <2hrs before bedtime)   Keep food diary, avoid food triggers (such as alcohol, fatty, spicy, red sauce)     8. Type II diabetes mellitus with peripheral autonomic neuropathy  Assessment & Plan:  See above for further details   Start gabapentin 300 mg BID for peripheral neuropathy pain     9. Lumbar back pain with radiculopathy affecting lower extremity  Assessment & Plan:  Multifactorial  Start gabapentin 300 mg BID   Ordered XR of Lumbar Spine   Take tylenol as needed, Voltaren gel as needed; avoid NSAID given CKD   Consider MRI and PT referral     10. Seasonal allergic rhinitis, unspecified trigger  Assessment & Plan:  Episodic   Take OTC allergy medication (recommended Zyrtec)  Prescribed flonase nasal spray   Consider ENT referral     11. MARIVEL (generalized anxiety disorder)  Assessment & Plan:  Stable, improved   Continue fluoxetine 10mg daily  Encouraged self coping mechanisms (deep breathing, exercise, yoga, meditation, etc)     Follow up in about 3 months (around 1/10/2023) for Medical Management . In addition to their scheduled follow up, the patient has also been instructed to follow up on as needed basis.     This new patient visit lasted >60 minutes including the patient interview and exam as well as an in-depth review of previous provider notes, labs, radiology, etc.  This chart preparation and review was performed on the same day as the  visit.  > 50% of the total time was spent in face-to-face discussion with the patient regarding the diagnoses, treatment options, current medications, side effects/adverse events of all treatment options, review of labs and imaging results with the patient, counseling, then arranging a plan for a follow-up visit.  Patient understanding and agreement with the plan was confirmed.  All questions were answered prior to the conclusion of the visit.  >20 minutes spent in chart preparation immediately preceding the visit  >45  minutes spent face-to-face with patient  >20 minutes spent on orders, referrals, and documentation

## 2022-10-10 NOTE — LETTER
AUTHORIZATION FOR RELEASE OF   CONFIDENTIAL INFORMATION    Dear Dr. Galeano,    We are seeing Kirstie Christianson, date of birth 1970, in the clinic at [facility]. Marie Brambila MD is the patient's PCP. Kirstie Christianson has an outstanding lab/procedure at the time we reviewed her chart. In order to help keep her health information updated, she has authorized us to request the following medical record(s):        (  )  MAMMOGRAM                                      (  )  COLONOSCOPY      (  )  PAP SMEAR                                          ( x )  OUTSIDE LAB RESULTS     (  )  DEXA SCAN                                          (  )  EYE EXAM            (  )  FOOT EXAM                                          (  )  ENTIRE RECORD     (  )  OUTSIDE IMMUNIZATIONS                 ( x )  _last 3 OVN, any imaging or other test_         Please fax records to [facility], Madelaine Fontenot, MD, [phone]     If you have any questions, please contact us at [phone].           Patient Name: Kirstie Christianson  : 1970  Patient Phone #: [phone]

## 2022-10-11 ENCOUNTER — TELEPHONE (OUTPATIENT)
Dept: PRIMARY CARE CLINIC | Facility: CLINIC | Age: 52
End: 2022-10-11
Payer: COMMERCIAL

## 2022-10-12 DIAGNOSIS — M54.16 LUMBAR BACK PAIN WITH RADICULOPATHY AFFECTING LOWER EXTREMITY: Primary | ICD-10-CM

## 2022-10-12 PROBLEM — R91.1 PULMONARY NODULE: Chronic | Status: ACTIVE | Noted: 2022-10-10

## 2022-10-12 PROBLEM — F41.1 GAD (GENERALIZED ANXIETY DISORDER): Chronic | Status: ACTIVE | Noted: 2022-10-10

## 2022-10-12 PROBLEM — K21.9 GASTROESOPHAGEAL REFLUX DISEASE: Chronic | Status: ACTIVE | Noted: 2022-10-10

## 2022-10-12 PROBLEM — E11.43 TYPE II DIABETES MELLITUS WITH PERIPHERAL AUTONOMIC NEUROPATHY: Chronic | Status: ACTIVE | Noted: 2022-10-10

## 2022-10-12 PROBLEM — E11.3299 MILD NONPROLIFERATIVE DIABETIC RETINOPATHY WITHOUT MACULAR EDEMA ASSOCIATED WITH TYPE 2 DIABETES MELLITUS: Chronic | Status: ACTIVE | Noted: 2022-10-10

## 2022-10-12 PROBLEM — J30.2 SEASONAL ALLERGIC RHINITIS: Chronic | Status: ACTIVE | Noted: 2022-10-10

## 2022-10-12 PROBLEM — E78.2 MIXED HYPERLIPIDEMIA: Chronic | Status: ACTIVE | Noted: 2022-10-10

## 2022-10-12 PROBLEM — I50.20 HEART FAILURE WITH REDUCED EJECTION FRACTION: Chronic | Status: ACTIVE | Noted: 2022-10-10

## 2022-10-12 PROBLEM — E11.65 TYPE 2 DIABETES MELLITUS WITH HYPERGLYCEMIA, WITHOUT LONG-TERM CURRENT USE OF INSULIN: Chronic | Status: ACTIVE | Noted: 2022-10-10

## 2022-10-12 PROBLEM — E66.812 CLASS 2 SEVERE OBESITY WITH SERIOUS COMORBIDITY AND BODY MASS INDEX (BMI) OF 37.0 TO 37.9 IN ADULT: Chronic | Status: ACTIVE | Noted: 2022-10-10

## 2022-10-12 PROBLEM — E66.01 CLASS 2 SEVERE OBESITY WITH SERIOUS COMORBIDITY AND BODY MASS INDEX (BMI) OF 37.0 TO 37.9 IN ADULT: Chronic | Status: ACTIVE | Noted: 2022-10-10

## 2022-10-12 RX ORDER — DICLOFENAC SODIUM 10 MG/G
2 GEL TOPICAL 4 TIMES DAILY
Qty: 100 G | Refills: 4 | Status: SHIPPED | OUTPATIENT
Start: 2022-10-12

## 2022-10-12 NOTE — ASSESSMENT & PLAN NOTE
Multifactorial  Start gabapentin 300 mg BID   Ordered XR of Lumbar Spine   Take tylenol as needed, Voltaren gel as needed; avoid NSAID given CKD   Consider MRI and PT referral

## 2022-10-12 NOTE — ASSESSMENT & PLAN NOTE
Today's BP WNL   Continue Coreg, Olmesartan, Norvasc, Lasix   Counseled on low sodium diet, exercise, tobacco avoidance, and limiting alcohol intake   Pt. Has home BP cuff, instructed to measure home BP and report abnormal values

## 2022-10-12 NOTE — ASSESSMENT & PLAN NOTE
Last Lipid Panel WNL  ASCVD calculated 10 year risk -moderate to high   Increase to 40mg of Lipitor, resent prescription   Counseled on dietary modifications  Counseled to exercise 150 minutes weekly as exercise raises HDL and lowers LDL

## 2022-10-12 NOTE — ASSESSMENT & PLAN NOTE
Non-ischemic Cardiomyopathy, [facility] negative in 2021  Echocardiogram in 2021 showed EF of 30-35%, needs repeat  Still symptomatic - MURDOCK, Orthopnea, Swelling  Continue Olmesartan, Coreg, Lasix (patient may be a good candidate for Entresto, defer to Cardiology)   Requested records from Dr. Galeano  Sent referral to [facility], patient would like to switch cardiology at this time, needs further optimization, doesn't appears to be a baseline. Would benefit from Cardiac Rehab and HF education.  Started Synjardy XR for both DM and HF control/benefit   Weigh self daily - if 2-3lbs in 24hrs or 5lbs in 1 week, take extra dose of lasix

## 2022-10-12 NOTE — ASSESSMENT & PLAN NOTE
Stable, improved   Continue fluoxetine 10mg daily  Encouraged self coping mechanisms (deep breathing, exercise, yoga, meditation, etc)

## 2022-10-12 NOTE — ASSESSMENT & PLAN NOTE
Encouraged healthy lifestyle/diet modifications   Exercise 3-5x a week (>30 minutes, including a variety of cardio, weightbearing and lifting exercises)   Eat a well balanced diet comprised of fruit/vegetables, lean protein, and complex carbs  Start Trulicity weekly injection for both glycemic/weight control

## 2022-10-12 NOTE — ASSESSMENT & PLAN NOTE
Most recent A1c was 7.5, goal A1c <7.0%   Discontinue Glipizide, metformin and farxiga   Start Synjardy XR, and Trulicity   Continue ACE-I and Statin   Continue ADA diet, Counseled on importance of diet & weight loss

## 2022-10-12 NOTE — ASSESSMENT & PLAN NOTE
Episodic   Take OTC allergy medication (recommended Zyrtec)  Prescribed flonase nasal spray   Consider ENT referral

## 2022-10-12 NOTE — ASSESSMENT & PLAN NOTE
Stable, continue protonix 20mg daily, refilled  Encouraged lifestyle modifications (sit upright for >30 mins after meals, do not eat <2hrs before bedtime)   Keep food diary, avoid food triggers (such as alcohol, fatty, spicy, red sauce)

## 2022-10-19 ENCOUNTER — CLINICAL SUPPORT (OUTPATIENT)
Dept: DIABETES | Facility: CLINIC | Age: 52
End: 2022-10-19
Payer: COMMERCIAL

## 2022-10-19 VITALS — WEIGHT: 206.38 LBS | BODY MASS INDEX: 37.98 KG/M2 | HEIGHT: 62 IN

## 2022-10-19 DIAGNOSIS — E11.59 TYPE 2 DIABETES MELLITUS WITH OTHER CIRCULATORY COMPLICATION, WITHOUT LONG-TERM CURRENT USE OF INSULIN: ICD-10-CM

## 2022-10-19 PROCEDURE — G0108 PR DIAB MANAGE TRN  PER INDIV: ICD-10-PCS | Mod: ,,, | Performed by: INTERNAL MEDICINE

## 2022-10-19 PROCEDURE — G0108 DIAB MANAGE TRN  PER INDIV: HCPCS | Mod: ,,, | Performed by: INTERNAL MEDICINE

## 2022-10-19 NOTE — PROGRESS NOTES
Diabetes Care Specialist Progress Note  Author: Lor Benjamin RN  Date: 10/19/2022    Program Intake  Current diabetes risk level:: moderate  In the last 12 months, have you:: none  Permission to speak with others about care:: no    Lab Results   Component Value Date    HGBA1C 7.5 (H) 09/29/2022       Clinical    Patient Health Rating  Compared to other people your age, how would you rate your health?: Fair    Problem Review  Reviewed Problem List with Patient: yes  Active comorbidities affecting diabetes self-care.: no    Clinical Assessment  Current Diabetes Treatment: Injectable, Oral Medication (Trulicity 1.5mg every monday, Synjardy 12.5/1000mg daily)  Have you ever experienced hypoglycemia (low blood sugar)?: no  Have you ever experienced hyperglycemia (high blood sugar)?: no    Medication Information  How do you obtain your medications?: Patient drives  How many days a week do you miss your medications?: Never  Do you use a pill box or medication chart to help you manage your medications?: Pill box  Do you sometimes have difficulty refilling your medications?: No  Medication adherence impacting ability to self-manage diabetes?: No    Labs  Do you have regular lab work to monitor your medications?: Yes  Type of Regular Lab Work: A1c  Lab Compliance Barriers: No    Nutritional Status  Diet: Regular  Meal Plan 24 Hour Recall: Breakfast, Lunch, Dinner, Snack  Meal Plan 24 Hour Recall - Breakfast: 3 mini pancakes, and egg,sausage, cheese burrito and coffee with sweet cream liq creamer  Meal Plan 24 Hour Recall - Lunch: burger, fries and sweet tea  Meal Plan 24 Hour Recall - Dinner: oatmeal with milk sugar and cornflakes on top  Meal Plan 24 Hour Recall - Snack: loves sweets any kind  Change in appetite?: No  Dentation:: Intact  Recent Changes in Weight: No Recent Weight Change  Current nutritional status an area of need that is impacting patient's ability to self-manage diabetes?: No    Additional Social  History    Support  Does anyone support you with your diabetes care?: yes  Who supports you?: spouse, self  Who takes you to your medical appointments?: self  Does the current support meet the patient's needs?: Yes  Is Support an area impacting ability to self-manage diabetes?: No    Access to Mass Media & Technology  Does the patient have access to any of the following devices or technologies?: Smart phone, Internet Access  Media or technology needs impacting ability to self-manage diabetes?: No    Cognitive/Behavioral Health  Alert and Oriented: Yes  Difficulty Thinking: No  Requires Prompting: No  Requires assistance for routine expression?: No  Cognitive or behavioral barriers impacting ability to self-manage diabetes?: No    Culture/Rastafarian  Culture or Methodist beliefs that may impact ability to access healthcare: No    Communication  Language preference: English  Hearing Problems: No  Vision Problems: No  Communication needs impacting ability to self-manage diabetes?: No    Health Literacy  Preferred Learning Method: Face to Face, Web Based  How often do you need to have someone help you read instructions, pamphlets, or written material from your doctor or pharmacy?: Rarely  Health literacy needs impacting ability to self-manage diabetes?: No      Diabetes Self-Management Skills Assessment    Diabetes Disease Process/Treatment Options  Diabetes Disease Process/Treatment Options: Skills Assessment Completed: No    Nutrition/Healthy Eating  Challenges to healthy eating:: eating out, going to parties, portion control, lack of will power, snacking between meals and at night  Method of carbohydrate measurement:: no method  Patient can identify foods that impact blood sugar.: no (see comments)  Nutrition/Healthy Eating Skills Assessment Completed:: Yes  Assessment indicates:: Knowledge deficit, Instruction Needed  Area of need?: Yes    Physical Activity/Exercise  Physical Activity/Exercise Skills Assessment  Completed: : No  Deffered due to:: Time    Medications  Medication Skills Assessment Completed:: No  Deffered due to:: Time    Home Blood Glucose Monitoring  Patient states that blood sugar is checked at home daily.: yes  Monitoring Method:: home glucometer  Home glucometer meter type:: Accu chek  How often do you check your blood sugar?: Once a day  When do you check your blood sugar?: Before breakfast  When you check what is your typical blood sugar range? : 130-150  Blood glucose logs:: no, encouraged to keep logs, encouraged to bring logs to provider visits  Blood glucose logs reviewed today?: no  Home Blood Glucose Monitoring Skills Assessment Completed: : Yes  Assessment indicates:: Adequate understanding  Area of need?: No    Acute Complications  Patient is able to identify types of acute complications: No  Acute Complications Skills Assessment Completed: : Yes  Assessment indicates:: Knowledge deficit, Instruction Needed  Area of need?: Yes    Chronic Complications  Chronic Complications Skills Assessment Completed: : No  Deferred due to:: Time    Psychosocial/Coping  Patient can identify ways of coping with chronic disease.: yes  Patient-stated ways of coping with chronic disease:: support from loved ones  Psychosocial/Coping Skills Assessment Completed: : Yes  Assessment indicates:: Adequate understanding  Area of need?: No      Diabetes Self Support Plan         Assessment Summary and Plan    Based on today's diabetes care assessment, the following areas of need were identified:      Social 10/19/2022   Support No   Access to Mass Media/Tech No   Cognitive/Behavioral Health No   Culture/Moravian No   Communication No   Health Literacy No        Clinical 10/19/2022   Medication Adherence No   Lab Compliance No   Nutritional Status No        Diabetes Self-Management Skills 10/19/2022   Nutrition/Healthy Eating Yes   Home Blood Glucose Monitoring No   Acute Complications Yes   Psychosocial/Coping No           Today's interventions were provided through individual discussion, instruction, and written materials were provided.      Patient verbalized understanding of instruction and written materials.  Pt was able to return back demonstration of instructions today. Patient understood key points, needs reinforcement and further instruction.     Diabetes Self-Management Care Plan:    Today's Diabetes Self-Management Care Plan was developed with Kirstie's input. Kirstie has agreed to work toward the following goal(s) to improve his/her overall diabetes control.      Care Plan: Diabetes Management   Updates made since 9/19/2022 12:00 AM        Problem: Healthy Eating         Long-Range Goal: Eat 3 meals daily with 30-45grams/ 2-3 servings of Carbohydrate per meal and add 1-2 non starchy vegetables    Start Date: 10/19/2022   Priority: High   Barriers: Knowledge deficit        Task: Reviewed the sources and role of Carbohydrate, Protein, and Fat and how each nutrient impacts blood sugar. Completed 10/19/2022        Task: Provided visual examples using dry measuring cups, food models, and other familiar objects such as computer mouse, deck or cards, tennis ball etc. to help with visualization of portions. Completed 10/19/2022        Task: Explained how to count carbohydrates using the food label and the use of dry measuring cups for accurate carb counting. Completed 10/19/2022        Task: Discussed strategies for choosing healthier menu options when dining out. Completed 10/19/2022        Task: Recommended replacing beverages containing high sugar content with noncaloric/sugar free options and/or water. Completed 10/19/2022        Task: Review the importance of balancing carbohydrates with each meal using portion control techniques to count servings of carbohydrate and label reading to identify serving size and amount of total carbs per serving.    Note:    She has been drinking sweet tea, soda and koolaid, adds sugar to  oatmeal and carrots.  Not sure if she will like artificial sweetners but is willing to try. She doesn't like lots of non-starchy vegetables but will try cutting them small and mixing with foods that she does like.        Task: Provided Sample plate method and reviewed the use of the plate to estimate amounts of carbohydrate per meal. Completed 10/19/2022        Problem: Acute Complications         Long-Range Goal: Patient agrees to identify and manage signs and symptoms of high/low blood sugar (hyper/hypoglycemia) by keeping a log of events and using proper treatment.    Start Date: 10/19/2022   Expected End Date: 11/16/2022   Priority: Low   Barriers: Knowledge deficit   Note:    Denies hypoglycemia       Task: Reviewed proper treatment of hypoglycemia with the rule of 15--patient to eat 15g simple carbohydrate (4 glucose tablets, 1 glucose gel, 5 pieces hard candy, ½ cup fruit juice, ½ can regular soda, etc) and wait 15 minutes and recheck home glucose. Completed 10/19/2022        Task: Reviewed common causes and precautions to help prevent hyper/hypoglycemic events. Completed 10/19/2022        Task: Reviewed signs and symptoms of hyper/hypoglycemia, what range is considered to be hyper/hypoglycemia, and when to seek further medical attention. Completed 10/19/2022        Problem: Blood Glucose Self-Monitoring         Long-Range Goal: Patient agrees to check and record blood sugars 1-2per times per day.    Start Date: 10/19/2022   Priority: Medium   Barriers: Knowledge deficit        Task: Reviewed the importance of self-monitoring blood glucose and keeping logs. Completed 10/19/2022        Task: Provided patient with blood glucose logs, reviewed appropriate timing and frequency to SMBG, education on parameters on when to notify provider and advised patient to bring logs to all appts with PCP/Endocrinologist/Diabetes Care Specialist. Completed 10/19/2022          Follow Up Plan     Follow up in about 4 weeks  (around 11/16/2022) for nutrition and glucose monitoring .    Today's care plan and follow up schedule was discussed with patient.  Kirstie verbalized understanding of the care plan, goals, and agrees to follow up plan.        The patient was encouraged to communicate with his/her health care provider/physician and care team regarding his/her condition(s) and treatment.  I provided the patient with my contact information today and encouraged to contact me via phone or [facility] Patient Portal as needed.     Length of Visit   Total Time: 60 Minutes   Diabetes Care Specialist Progress Note  Author: Lor Benjamin RN  Date: 10/19/2022    Program Intake  Current diabetes risk level:: moderate  In the last 12 months, have you:: none  Permission to speak with others about care:: no    Lab Results   Component Value Date    HGBA1C 7.5 (H) 09/29/2022       Clinical    Patient Health Rating  Compared to other people your age, how would you rate your health?: Fair    Problem Review  Reviewed Problem List with Patient: yes  Active comorbidities affecting diabetes self-care.: no    Clinical Assessment  Current Diabetes Treatment: Injectable, Oral Medication (Trulicity 1.5mg every monday, Synjardy 12.5/1000mg daily)  Have you ever experienced hypoglycemia (low blood sugar)?: no  Have you ever experienced hyperglycemia (high blood sugar)?: no    Medication Information  How do you obtain your medications?: Patient drives  How many days a week do you miss your medications?: Never  Do you use a pill box or medication chart to help you manage your medications?: Pill box  Do you sometimes have difficulty refilling your medications?: No  Medication adherence impacting ability to self-manage diabetes?: No    Labs  Do you have regular lab work to monitor your medications?: Yes  Type of Regular Lab Work: A1c  Lab Compliance Barriers: No    Nutritional Status  Diet: Regular  Meal Plan 24 Hour Recall: Breakfast, Lunch, Dinner,  Snack  Meal Plan 24 Hour Recall - Breakfast: 3 mini pancakes, and egg,sausage, cheese burrito and coffee with sweet cream liq creamer  Meal Plan 24 Hour Recall - Lunch: burger, fries and sweet tea  Meal Plan 24 Hour Recall - Dinner: oatmeal with milk sugar and cornflakes on top  Meal Plan 24 Hour Recall - Snack: loves sweets any kind  Change in appetite?: No  Dentation:: Intact  Recent Changes in Weight: No Recent Weight Change  Current nutritional status an area of need that is impacting patient's ability to self-manage diabetes?: No    Additional Social History    Support  Does anyone support you with your diabetes care?: yes  Who supports you?: spouse, self  Who takes you to your medical appointments?: self  Does the current support meet the patient's needs?: Yes  Is Support an area impacting ability to self-manage diabetes?: No    Access to Mass Media & Technology  Does the patient have access to any of the following devices or technologies?: Smart phone, Internet Access  Media or technology needs impacting ability to self-manage diabetes?: No    Cognitive/Behavioral Health  Alert and Oriented: Yes  Difficulty Thinking: No  Requires Prompting: No  Requires assistance for routine expression?: No  Cognitive or behavioral barriers impacting ability to self-manage diabetes?: No    Culture/Catholic  Culture or Restorationist beliefs that may impact ability to access healthcare: No    Communication  Language preference: English  Hearing Problems: No  Vision Problems: No  Communication needs impacting ability to self-manage diabetes?: No    Health Literacy  Preferred Learning Method: Face to Face, Web Based  How often do you need to have someone help you read instructions, pamphlets, or written material from your doctor or pharmacy?: Rarely  Health literacy needs impacting ability to self-manage diabetes?: No      Diabetes Self-Management Skills Assessment    Diabetes Disease Process/Treatment Options  Diabetes Disease  Process/Treatment Options: Skills Assessment Completed: No    Nutrition/Healthy Eating  Challenges to healthy eating:: eating out, going to parties, portion control, lack of will power, snacking between meals and at night  Method of carbohydrate measurement:: no method  Patient can identify foods that impact blood sugar.: no (see comments)  Nutrition/Healthy Eating Skills Assessment Completed:: Yes  Assessment indicates:: Knowledge deficit, Instruction Needed  Area of need?: Yes    Physical Activity/Exercise  Physical Activity/Exercise Skills Assessment Completed: : No  Deffered due to:: Time    Medications  Medication Skills Assessment Completed:: No  Deffered due to:: Time    Home Blood Glucose Monitoring  Patient states that blood sugar is checked at home daily.: yes  Monitoring Method:: home glucometer  Home glucometer meter type:: Accu chek  How often do you check your blood sugar?: Once a day  When do you check your blood sugar?: Before breakfast  When you check what is your typical blood sugar range? : 130-150  Blood glucose logs:: no, encouraged to keep logs, encouraged to bring logs to provider visits  Blood glucose logs reviewed today?: no  Home Blood Glucose Monitoring Skills Assessment Completed: : Yes  Assessment indicates:: Adequate understanding  Area of need?: No    Acute Complications  Patient is able to identify types of acute complications: No  Acute Complications Skills Assessment Completed: : Yes  Assessment indicates:: Knowledge deficit, Instruction Needed  Area of need?: Yes    Chronic Complications  Chronic Complications Skills Assessment Completed: : No  Deferred due to:: Time    Psychosocial/Coping  Patient can identify ways of coping with chronic disease.: yes  Patient-stated ways of coping with chronic disease:: support from loved ones  Psychosocial/Coping Skills Assessment Completed: : Yes  Assessment indicates:: Adequate understanding  Area of need?: No      Diabetes Self Support  Plan         Assessment Summary and Plan    Based on today's diabetes care assessment, the following areas of need were identified:      Social 10/19/2022   Support No   Access to Mass Media/Tech No   Cognitive/Behavioral Health No   Culture/Congregation No   Communication No   Health Literacy No        Clinical 10/19/2022   Medication Adherence No   Lab Compliance No   Nutritional Status No        Diabetes Self-Management Skills 10/19/2022   Nutrition/Healthy Eating Yes   Home Blood Glucose Monitoring No   Acute Complications Yes   Psychosocial/Coping No          Today's interventions were provided through individual discussion, instruction, and written materials were provided.      Patient verbalized understanding of instruction and written materials.  Pt was able to return back demonstration of instructions today. Patient understood key points, needs reinforcement and further instruction.     Diabetes Self-Management Care Plan:    Today's Diabetes Self-Management Care Plan was developed with Kirstie's input. Kirstie has agreed to work toward the following goal(s) to improve his/her overall diabetes control.      Care Plan: Diabetes Management   Updates made since 9/19/2022 12:00 AM        Problem: Healthy Eating         Long-Range Goal: Eat 3 meals daily with 30-45grams/ 2-3 servings of Carbohydrate per meal and add 1-2 non starchy vegetables    Start Date: 10/19/2022   Priority: High   Barriers: Knowledge deficit        Task: Reviewed the sources and role of Carbohydrate, Protein, and Fat and how each nutrient impacts blood sugar. Completed 10/19/2022        Task: Provided visual examples using dry measuring cups, food models, and other familiar objects such as computer mouse, deck or cards, tennis ball etc. to help with visualization of portions. Completed 10/19/2022        Task: Explained how to count carbohydrates using the food label and the use of dry measuring cups for accurate carb counting. Completed  10/19/2022        Task: Discussed strategies for choosing healthier menu options when dining out. Completed 10/19/2022        Task: Recommended replacing beverages containing high sugar content with noncaloric/sugar free options and/or water. Completed 10/19/2022        Task: Review the importance of balancing carbohydrates with each meal using portion control techniques to count servings of carbohydrate and label reading to identify serving size and amount of total carbs per serving.    Note:    She has been drinking sweet tea, soda and koolaid, adds sugar to oatmeal and carrots.  Not sure if she will like artificial sweetners but is willing to try. She doesn't like lots of non-starchy vegetables but will try cutting them small and mixing with foods that she does like.        Task: Provided Sample plate method and reviewed the use of the plate to estimate amounts of carbohydrate per meal. Completed 10/19/2022        Problem: Acute Complications         Long-Range Goal: Patient agrees to identify and manage signs and symptoms of high/low blood sugar (hyper/hypoglycemia) by keeping a log of events and using proper treatment.    Start Date: 10/19/2022   Expected End Date: 11/16/2022   Priority: Low   Barriers: Knowledge deficit   Note:    Denies hypoglycemia       Task: Reviewed proper treatment of hypoglycemia with the rule of 15--patient to eat 15g simple carbohydrate (4 glucose tablets, 1 glucose gel, 5 pieces hard candy, ½ cup fruit juice, ½ can regular soda, etc) and wait 15 minutes and recheck home glucose. Completed 10/19/2022        Task: Reviewed common causes and precautions to help prevent hyper/hypoglycemic events. Completed 10/19/2022        Task: Reviewed signs and symptoms of hyper/hypoglycemia, what range is considered to be hyper/hypoglycemia, and when to seek further medical attention. Completed 10/19/2022        Problem: Blood Glucose Self-Monitoring         Long-Range Goal: Patient agrees to  check and record blood sugars 1-2per times per day.    Start Date: 10/19/2022   Priority: Medium   Barriers: Knowledge deficit        Task: Reviewed the importance of self-monitoring blood glucose and keeping logs. Completed 10/19/2022        Task: Provided patient with blood glucose logs, reviewed appropriate timing and frequency to SMBG, education on parameters on when to notify provider and advised patient to bring logs to all appts with PCP/Endocrinologist/Diabetes Care Specialist. Completed 10/19/2022          Follow Up Plan     Follow up in about 4 weeks (around 11/16/2022) for nutrition and glucose monitoring .  Appt made with pt. Today for 11/16/2022 at 1pm.      Today's care plan and follow up schedule was discussed with patient.  Kirstie verbalized understanding of the care plan, goals, and agrees to follow up plan.        The patient was encouraged to communicate with his/her health care provider/physician and care team regarding his/her condition(s) and treatment.  I provided the patient with my contact information today and encouraged to contact me via phone or [facility]'s Patient Portal as needed.     Length of Visit   Total Time: 60 Minutes

## 2022-10-31 DIAGNOSIS — E11.3299 MILD NONPROLIFERATIVE DIABETIC RETINOPATHY WITHOUT MACULAR EDEMA ASSOCIATED WITH TYPE 2 DIABETES MELLITUS, UNSPECIFIED LATERALITY: Chronic | ICD-10-CM

## 2022-10-31 DIAGNOSIS — K21.9 GASTROESOPHAGEAL REFLUX DISEASE, UNSPECIFIED WHETHER ESOPHAGITIS PRESENT: ICD-10-CM

## 2022-10-31 DIAGNOSIS — F41.1 GAD (GENERALIZED ANXIETY DISORDER): Chronic | ICD-10-CM

## 2022-10-31 DIAGNOSIS — I50.20 HEART FAILURE WITH REDUCED EJECTION FRACTION: ICD-10-CM

## 2022-10-31 DIAGNOSIS — E11.59 TYPE 2 DIABETES MELLITUS WITH OTHER CIRCULATORY COMPLICATION, WITHOUT LONG-TERM CURRENT USE OF INSULIN: Primary | ICD-10-CM

## 2022-10-31 RX ORDER — EMPAGLIFLOZIN, METFORMIN HYDROCHLORIDE 12.5; 1 MG/1; MG/1
1 TABLET, EXTENDED RELEASE ORAL DAILY
Qty: 90 TABLET | Refills: 3 | Status: SHIPPED | OUTPATIENT
Start: 2022-10-31 | End: 2023-09-05

## 2022-10-31 RX ORDER — FLUOXETINE 10 MG/1
10 CAPSULE ORAL DAILY
Qty: 90 CAPSULE | Refills: 1 | Status: SHIPPED | OUTPATIENT
Start: 2022-10-31

## 2022-11-16 ENCOUNTER — CLINICAL SUPPORT (OUTPATIENT)
Dept: DIABETES | Facility: CLINIC | Age: 52
End: 2022-11-16
Payer: COMMERCIAL

## 2022-11-16 ENCOUNTER — TELEPHONE (OUTPATIENT)
Dept: FAMILY MEDICINE | Facility: CLINIC | Age: 52
End: 2022-11-16
Payer: COMMERCIAL

## 2022-11-16 VITALS — WEIGHT: 201.31 LBS | BODY MASS INDEX: 37.04 KG/M2 | HEIGHT: 62 IN

## 2022-11-16 DIAGNOSIS — Z00.00 WELLNESS EXAMINATION: Primary | ICD-10-CM

## 2022-11-16 DIAGNOSIS — E11.59 TYPE 2 DIABETES MELLITUS WITH OTHER CIRCULATORY COMPLICATION, WITHOUT LONG-TERM CURRENT USE OF INSULIN: Primary | ICD-10-CM

## 2022-11-16 DIAGNOSIS — E11.9 TYPE 2 DIABETES MELLITUS WITHOUT COMPLICATION, WITHOUT LONG-TERM CURRENT USE OF INSULIN: ICD-10-CM

## 2022-11-16 PROCEDURE — G0108 DIAB MANAGE TRN  PER INDIV: HCPCS | Mod: ,,, | Performed by: INTERNAL MEDICINE

## 2022-11-16 PROCEDURE — G0108 PR DIAB MANAGE TRN  PER INDIV: ICD-10-PCS | Mod: ,,, | Performed by: INTERNAL MEDICINE

## 2022-11-16 NOTE — PROGRESS NOTES
Diabetes Care Specialist Progress Note  Author: Lor Benjamin RN  Date: 11/16/2022    Program Intake  Reason for Diabetes Program Visit:: Intervention  Type of Intervention:: Individual  Individual: Education  Education: Nutrition and Meal Planning  Current diabetes risk level:: low  In the last 12 months, have you:: none    Lab Results   Component Value Date    HGBA1C 7.5 (H) 09/29/2022       Clinical                                  Additional Social History                                    Diabetes Self-Management Skills Assessment              Physical Activity/Exercise  Patient's daily activity level:: lightly active  Patient formally exercises outside of work.: yes  Exercise Type: other (see comments) (Started Physical therapy two weeks ago)  Intensity: Low  Frequency: once a week  Duration: 30 min  Patient can identify forms of physical activity.: yes  Stated forms of physical activity:: housework, other (see comments) (Walking)  Patient can identify reasons why exercise/physical activity is important in diabetes management.: no  Physical Activity/Exercise Skills Assessment Completed: : Yes  Assessment indicates:: Knowledge deficit, Instruction Needed  Area of need?: Yes         Home Blood Glucose Monitoring  When you check what is your typical blood sugar range? : 104-120  Blood glucose logs:: no, encouraged to keep logs, encouraged to bring logs to provider visits         Chronic Complications  Patient can identify major chronic complications of diabetes.: no  Patient can identify ways to prevent or delay diabetes complications.: no  Patient is aware that having diabetes increases risk of heart disease?: No  Patient is aware that heart disease is the leading cause of death and disability in people with diabetes?: No  Patient able to state risk factors for heart disease?: Yes  Patient stated risk factors for heart disease:: High blood pressure, High cholesterol, Having diabetes  Patient is taking  statin?: Yes  Chronic Complications Skills Assessment Completed: : Yes  Assessment indicates:: Knowledge deficit, Instruction Needed  Area of need?: Yes           Diabetes Self Support Plan         Assessment Summary and Plan    Based on today's diabetes care assessment, the following areas of need were identified:      Social 10/19/2022   Support No   Access to Mass Media/Tech No   Cognitive/Behavioral Health No   Culture/Oriental orthodox No   Communication No   Health Literacy No        Clinical 10/19/2022   Medication Adherence No   Lab Compliance No   Nutritional Status No        Diabetes Self-Management Skills 11/16/2022   Nutrition/Healthy Eating -   Physical Activity/Exercise Yes   Home Blood Glucose Monitoring -   Acute Complications -   Chronic Complications Yes   Psychosocial/Coping -          Today's interventions were provided through individual discussion, instruction, and written materials were provided.      Patient verbalized understanding of instruction and written materials.  Pt was able to return back demonstration of instructions today. Patient understood key points, needs reinforcement and further instruction.     Diabetes Self-Management Care Plan:    Today's Diabetes Self-Management Care Plan was developed with Kirstie's input. Kirstie has agreed to work toward the following goal(s) to improve his/her overall diabetes control.      Care Plan: Diabetes Management   Updates made since 10/17/2022 12:00 AM        Problem: Healthy Eating         Long-Range Goal: Eat 3 meals daily with 30-45grams/ 2-3 servings of Carbohydrate per meal and add 1-2 non starchy vegetables    Start Date: 10/19/2022   Priority: High   Barriers: Knowledge deficit   Note:    States she started to add vegetables to meals then they just went to waste.  Encouraged buying one veggie per week, prepping right away and eating until gone then buy another vegetable to avoid waste and create new habit.  She is still drinking some soda but  less and swapped with some crystal lite       Task: Reviewed the sources and role of Carbohydrate, Protein, and Fat and how each nutrient impacts blood sugar. Completed 10/19/2022        Task: Provided visual examples using dry measuring cups, food models, and other familiar objects such as computer mouse, deck or cards, tennis ball etc. to help with visualization of portions. Completed 10/19/2022        Task: Explained how to count carbohydrates using the food label and the use of dry measuring cups for accurate carb counting. Completed 10/19/2022        Task: Discussed strategies for choosing healthier menu options when dining out. Completed 10/19/2022        Task: Recommended replacing beverages containing high sugar content with noncaloric/sugar free options and/or water. Completed 10/19/2022        Task: Review the importance of balancing carbohydrates with each meal using portion control techniques to count servings of carbohydrate and label reading to identify serving size and amount of total carbs per serving. Completed 11/16/2022   Note:    She has been drinking sweet tea, soda and koolaid, adds sugar to oatmeal and carrots.  Not sure if she will like artificial sweetners but is willing to try. She doesn't like lots of non-starchy vegetables but will try cutting them small and mixing with foods that she does like.        Task: Provided Sample plate method and reviewed the use of the plate to estimate amounts of carbohydrate per meal. Completed 10/19/2022        Problem: Acute Complications         Long-Range Goal: Patient agrees to identify and manage signs and symptoms of high/low blood sugar (hyper/hypoglycemia) by keeping a log of events and using proper treatment. Completed 11/16/2022   Start Date: 10/19/2022   Expected End Date: 11/16/2022   This Visit's Progress: Met   Priority: Low   Barriers: Knowledge deficit   Note:    Denies hypoglycemia       Task: Reviewed proper treatment of hypoglycemia with  the rule of 15--patient to eat 15g simple carbohydrate (4 glucose tablets, 1 glucose gel, 5 pieces hard candy, ½ cup fruit juice, ½ can regular soda, etc) and wait 15 minutes and recheck home glucose. Completed 10/19/2022        Task: Reviewed common causes and precautions to help prevent hyper/hypoglycemic events. Completed 10/19/2022        Task: Reviewed signs and symptoms of hyper/hypoglycemia, what range is considered to be hyper/hypoglycemia, and when to seek further medical attention. Completed 10/19/2022        Problem: Blood Glucose Self-Monitoring         Long-Range Goal: Patient agrees to check and record blood sugars 1-2per times per day.    Start Date: 10/19/2022   Priority: Medium   Barriers: Knowledge deficit   Note:    She is testing fbs only encouraged spot testing in evening. Discussed timing and goals of glucose.        Task: Reviewed the importance of self-monitoring blood glucose and keeping logs. Completed 10/19/2022        Task: Provided patient with blood glucose logs, reviewed appropriate timing and frequency to SMBG, education on parameters on when to notify provider and advised patient to bring logs to all appts with PCP/Endocrinologist/Diabetes Care Specialist. Completed 10/19/2022        Problem: Physical Activity and Exercise         Long-Range Goal: Patient agrees to increase physical activity to a goal of 4 times per week for  15 minutes.    Start Date: 11/16/2022   Priority: Medium   Barriers: Lack of Motivation to Change        Task: Discussed role of physical activity on reducing insulin resistance and improvement in overall glycemic control. Completed 11/16/2022        Task: Discussed role of physical activity as it relates to weight loss Completed 11/16/2022        Task: Offered suggestions on how patient could increase their regular physical activity Completed 11/16/2022        Problem: Chronic Complications         Long-Range Goal: Patient agrees to keep PCP appointments to  monitor chronic diagnosis and do screenings prescribed by pcp.    Start Date: 11/16/2022   Priority: Low   Barriers: Knowledge deficit   Note:    Has Pcp appt in January of 2023       Task: Discussed current A1c, Blood Pressure, and Cholesterol results (ABCs of Diabetes) and reviewed targets of each. Completed 11/16/2022        Task: Discussed importance of annual microalbumin urine test to monitor kidney function. Completed 11/16/2022        Task: Discussed importance of annual dilated eye exam for prevention of retinopathy. Completed 11/16/2022        Task: Discussed the importance of routine dental exams for the prevention of gum disease and gingivitis and encouraged dental exam every 6 months.         Task: Instructed on basic daily foot care and encouraged inspecting feet daily.           Follow Up Plan     Follow up in about 3 months (around 2/16/2023) for glucose monitoring.    Today's care plan and follow up schedule was discussed with patient.  Kirstie verbalized understanding of the care plan, goals, and agrees to follow up plan.        The patient was encouraged to communicate with his/her health care provider/physician and care team regarding his/her condition(s) and treatment.  I provided the patient with my contact information today and encouraged to contact me via phone or [facility] Patient Portal as needed.     Length of Visit   Total Time: 60 Minutes   Diabetes Care Specialist Progress Note  Author: Lor Benjamin RN  Date: 11/16/2022    Program Intake  Reason for Diabetes Program Visit:: Intervention  Type of Intervention:: Individual  Individual: Education  Education: Nutrition and Meal Planning  Current diabetes risk level:: low  In the last 12 months, have you:: none    Lab Results   Component Value Date    HGBA1C 7.5 (H) 09/29/2022       Clinical                                  Additional Social History                                    Diabetes Self-Management Skills Assessment               Physical Activity/Exercise  Patient's daily activity level:: lightly active  Patient formally exercises outside of work.: yes  Exercise Type: other (see comments) (Started Physical therapy two weeks ago)  Intensity: Low  Frequency: once a week  Duration: 30 min  Patient can identify forms of physical activity.: yes  Stated forms of physical activity:: housework, other (see comments) (Walking)  Patient can identify reasons why exercise/physical activity is important in diabetes management.: no  Physical Activity/Exercise Skills Assessment Completed: : Yes  Assessment indicates:: Knowledge deficit, Instruction Needed  Area of need?: Yes         Home Blood Glucose Monitoring  When you check what is your typical blood sugar range? : 104-120  Blood glucose logs:: no, encouraged to keep logs, encouraged to bring logs to provider visits         Chronic Complications  Patient can identify major chronic complications of diabetes.: no  Patient can identify ways to prevent or delay diabetes complications.: no  Patient is aware that having diabetes increases risk of heart disease?: No  Patient is aware that heart disease is the leading cause of death and disability in people with diabetes?: No  Patient able to state risk factors for heart disease?: Yes  Patient stated risk factors for heart disease:: High blood pressure, High cholesterol, Having diabetes  Patient is taking statin?: Yes  Chronic Complications Skills Assessment Completed: : Yes  Assessment indicates:: Knowledge deficit, Instruction Needed  Area of need?: Yes           Diabetes Self Support Plan         Assessment Summary and Plan    Based on today's diabetes care assessment, the following areas of need were identified:      Social 10/19/2022   Support No   Access to Mass Media/Tech No   Cognitive/Behavioral Health No   Culture/Muslim No   Communication No   Health Literacy No        Clinical 10/19/2022   Medication Adherence No   Lab Compliance No    Nutritional Status No        Diabetes Self-Management Skills 11/16/2022   Nutrition/Healthy Eating -   Physical Activity/Exercise Yes   Home Blood Glucose Monitoring -   Acute Complications -   Chronic Complications Yes   Psychosocial/Coping -          Today's interventions were provided through individual discussion, instruction, and written materials were provided.      Patient verbalized understanding of instruction and written materials.  Pt was able to return back demonstration of instructions today. Patient understood key points, needs reinforcement and further instruction.     Diabetes Self-Management Care Plan:    Today's Diabetes Self-Management Care Plan was developed with Kirstie's input. Kirstie has agreed to work toward the following goal(s) to improve his/her overall diabetes control.      Care Plan: Diabetes Management   Updates made since 10/17/2022 12:00 AM        Problem: Healthy Eating         Long-Range Goal: Eat 3 meals daily with 30-45grams/ 2-3 servings of Carbohydrate per meal and add 1-2 non starchy vegetables    Start Date: 10/19/2022   Priority: High   Barriers: Knowledge deficit   Note:    States she started to add vegetables to meals then they just went to waste.  Encouraged buying one veggie per week, prepping right away and eating until gone then buy another vegetable to avoid waste and create new habit.  She is still drinking some soda but less and swapped with some crystal lite       Task: Reviewed the sources and role of Carbohydrate, Protein, and Fat and how each nutrient impacts blood sugar. Completed 10/19/2022        Task: Provided visual examples using dry measuring cups, food models, and other familiar objects such as computer mouse, deck or cards, tennis ball etc. to help with visualization of portions. Completed 10/19/2022        Task: Explained how to count carbohydrates using the food label and the use of dry measuring cups for accurate carb counting. Completed  10/19/2022        Task: Discussed strategies for choosing healthier menu options when dining out. Completed 10/19/2022        Task: Recommended replacing beverages containing high sugar content with noncaloric/sugar free options and/or water. Completed 10/19/2022        Task: Review the importance of balancing carbohydrates with each meal using portion control techniques to count servings of carbohydrate and label reading to identify serving size and amount of total carbs per serving. Completed 11/16/2022   Note:    She has been drinking sweet tea, soda and koolaid, adds sugar to oatmeal and carrots.  Not sure if she will like artificial sweetners but is willing to try. She doesn't like lots of non-starchy vegetables but will try cutting them small and mixing with foods that she does like.        Task: Provided Sample plate method and reviewed the use of the plate to estimate amounts of carbohydrate per meal. Completed 10/19/2022        Problem: Acute Complications         Long-Range Goal: Patient agrees to identify and manage signs and symptoms of high/low blood sugar (hyper/hypoglycemia) by keeping a log of events and using proper treatment. Completed 11/16/2022   Start Date: 10/19/2022   Expected End Date: 11/16/2022   This Visit's Progress: Met   Priority: Low   Barriers: Knowledge deficit   Note:    Denies hypoglycemia       Task: Reviewed proper treatment of hypoglycemia with the rule of 15--patient to eat 15g simple carbohydrate (4 glucose tablets, 1 glucose gel, 5 pieces hard candy, ½ cup fruit juice, ½ can regular soda, etc) and wait 15 minutes and recheck home glucose. Completed 10/19/2022        Task: Reviewed common causes and precautions to help prevent hyper/hypoglycemic events. Completed 10/19/2022        Task: Reviewed signs and symptoms of hyper/hypoglycemia, what range is considered to be hyper/hypoglycemia, and when to seek further medical attention. Completed 10/19/2022        Problem: Blood  Glucose Self-Monitoring         Long-Range Goal: Patient agrees to check and record blood sugars 1-2per times per day.    Start Date: 10/19/2022   Priority: Medium   Barriers: Knowledge deficit   Note:    She is testing fbs only encouraged spot testing in evening. Discussed timing and goals of glucose.        Task: Reviewed the importance of self-monitoring blood glucose and keeping logs. Completed 10/19/2022        Task: Provided patient with blood glucose logs, reviewed appropriate timing and frequency to SMBG, education on parameters on when to notify provider and advised patient to bring logs to all appts with PCP/Endocrinologist/Diabetes Care Specialist. Completed 10/19/2022        Problem: Physical Activity and Exercise         Long-Range Goal: Patient agrees to increase physical activity to a goal of 4 times per week for  15 minutes.    Start Date: 11/16/2022   Priority: Medium   Barriers: Lack of Motivation to Change        Task: Discussed role of physical activity on reducing insulin resistance and improvement in overall glycemic control. Completed 11/16/2022        Task: Discussed role of physical activity as it relates to weight loss Completed 11/16/2022        Task: Offered suggestions on how patient could increase their regular physical activity Completed 11/16/2022        Problem: Chronic Complications         Long-Range Goal: Patient agrees to keep PCP appointments to monitor chronic diagnosis and do screenings prescribed by pcp.    Start Date: 11/16/2022   Priority: Low   Barriers: Knowledge deficit   Note:    Has Pcp appt in January of 2023       Task: Discussed current A1c, Blood Pressure, and Cholesterol results (ABCs of Diabetes) and reviewed targets of each. Completed 11/16/2022        Task: Discussed importance of annual microalbumin urine test to monitor kidney function. Completed 11/16/2022        Task: Discussed importance of annual dilated eye exam for prevention of retinopathy. Completed  11/16/2022        Task: Discussed the importance of routine dental exams for the prevention of gum disease and gingivitis and encouraged dental exam every 6 months.         Task: Instructed on basic daily foot care and encouraged inspecting feet daily.           Follow Up Plan     Follow up in about 3 months (around 2/16/2023) for glucose monitoring. Appt made with pt today for 1/11/2023.      Today's care plan and follow up schedule was discussed with patient.  Kirstie verbalized understanding of the care plan, goals, and agrees to follow up plan.        The patient was encouraged to communicate with his/her health care provider/physician and care team regarding his/her condition(s) and treatment.  I provided the patient with my contact information today and encouraged to contact me via phone or [facility] Patient Portal as needed.     Length of Visit   Total Time: 60 Minutes

## 2022-11-29 DIAGNOSIS — I10 PRIMARY HYPERTENSION: ICD-10-CM

## 2022-11-29 DIAGNOSIS — I50.20 HEART FAILURE WITH REDUCED EJECTION FRACTION: ICD-10-CM

## 2022-11-29 RX ORDER — METFORMIN HYDROCHLORIDE 1000 MG/1
1000 TABLET ORAL 2 TIMES DAILY
COMMUNITY
Start: 2022-06-27 | End: 2023-05-30

## 2022-11-29 RX ORDER — DAPAGLIFLOZIN 10 MG/1
10 TABLET, FILM COATED ORAL
COMMUNITY
Start: 2022-10-10 | End: 2023-01-10

## 2022-11-29 RX ORDER — DAPAGLIFLOZIN 5 MG/1
5 TABLET, FILM COATED ORAL
COMMUNITY
Start: 2022-07-13 | End: 2023-01-10

## 2022-11-29 RX ORDER — BLOOD SUGAR DIAGNOSTIC
STRIP MISCELLANEOUS
COMMUNITY
Start: 2022-10-10

## 2022-11-30 RX ORDER — CARVEDILOL 25 MG/1
25 TABLET ORAL 2 TIMES DAILY WITH MEALS
Qty: 60 TABLET | Refills: 2 | Status: SHIPPED | OUTPATIENT
Start: 2022-11-30 | End: 2023-06-20 | Stop reason: SDUPTHER

## 2022-12-09 DIAGNOSIS — I10 PRIMARY HYPERTENSION: ICD-10-CM

## 2022-12-09 RX ORDER — AMLODIPINE BESYLATE 5 MG/1
5 TABLET ORAL DAILY
Qty: 90 TABLET | Refills: 1 | Status: SHIPPED | OUTPATIENT
Start: 2022-12-09

## 2023-01-05 LAB
HUMAN PAPILLOMAVIRUS (HPV): NORMAL
PAP RECOMMENDATION EXT: NORMAL
PAP SMEAR: NORMAL

## 2023-01-06 ENCOUNTER — LAB VISIT (OUTPATIENT)
Dept: LAB | Facility: HOSPITAL | Age: 53
End: 2023-01-06
Attending: NURSE PRACTITIONER
Payer: COMMERCIAL

## 2023-01-06 DIAGNOSIS — Z00.00 WELLNESS EXAMINATION: ICD-10-CM

## 2023-01-06 DIAGNOSIS — E11.9 TYPE 2 DIABETES MELLITUS WITHOUT COMPLICATION, WITHOUT LONG-TERM CURRENT USE OF INSULIN: ICD-10-CM

## 2023-01-06 LAB
APPEARANCE UR: CLEAR
BACTERIA #/AREA URNS AUTO: ABNORMAL /HPF
BILIRUB UR QL STRIP.AUTO: NEGATIVE MG/DL
COLOR UR AUTO: YELLOW
CREAT UR-MCNC: 223.9 MG/DL (ref 47–110)
GLUCOSE UR QL STRIP.AUTO: 500 MG/DL
KETONES UR QL STRIP.AUTO: NEGATIVE MG/DL
LEUKOCYTE ESTERASE UR QL STRIP.AUTO: NEGATIVE UNIT/L
MICROALBUMIN UR-MCNC: 30.2 UG/ML
MICROALBUMIN/CREAT RATIO PNL UR: 13.5 MG/GM CR (ref 0–30)
NITRITE UR QL STRIP.AUTO: NEGATIVE
PH UR STRIP.AUTO: 6 [PH]
PROT UR QL STRIP.AUTO: ABNORMAL MG/DL
RBC #/AREA URNS AUTO: ABNORMAL /HPF
RBC UR QL AUTO: NEGATIVE UNIT/L
SP GR UR STRIP.AUTO: >=1.03
SQUAMOUS #/AREA URNS AUTO: ABNORMAL /HPF
UROBILINOGEN UR STRIP-ACNC: 1 MG/DL
WBC #/AREA URNS AUTO: ABNORMAL /HPF
YEAST URNS QL MICRO: ABNORMAL /HPF

## 2023-01-06 PROCEDURE — 82043 UR ALBUMIN QUANTITATIVE: CPT

## 2023-01-06 RX ORDER — ATORVASTATIN CALCIUM 80 MG/1
80 TABLET, FILM COATED ORAL
COMMUNITY
Start: 2022-11-03

## 2023-01-10 ENCOUNTER — OFFICE VISIT (OUTPATIENT)
Dept: PRIMARY CARE CLINIC | Facility: CLINIC | Age: 53
End: 2023-01-10
Payer: COMMERCIAL

## 2023-01-10 VITALS
TEMPERATURE: 99 F | HEIGHT: 62 IN | WEIGHT: 196 LBS | HEART RATE: 79 BPM | RESPIRATION RATE: 20 BRPM | DIASTOLIC BLOOD PRESSURE: 88 MMHG | SYSTOLIC BLOOD PRESSURE: 132 MMHG | OXYGEN SATURATION: 98 % | BODY MASS INDEX: 36.07 KG/M2

## 2023-01-10 DIAGNOSIS — E66.01 CLASS 2 SEVERE OBESITY WITH SERIOUS COMORBIDITY AND BODY MASS INDEX (BMI) OF 37.0 TO 37.9 IN ADULT, UNSPECIFIED OBESITY TYPE: Chronic | ICD-10-CM

## 2023-01-10 DIAGNOSIS — R74.01 TRANSAMINITIS: ICD-10-CM

## 2023-01-10 DIAGNOSIS — L30.9 ECZEMA, UNSPECIFIED TYPE: ICD-10-CM

## 2023-01-10 DIAGNOSIS — E78.2 MIXED HYPERLIPIDEMIA: Chronic | ICD-10-CM

## 2023-01-10 DIAGNOSIS — Z12.11 COLON CANCER SCREENING: ICD-10-CM

## 2023-01-10 DIAGNOSIS — I10 PRIMARY HYPERTENSION: Primary | Chronic | ICD-10-CM

## 2023-01-10 DIAGNOSIS — E11.65 TYPE 2 DIABETES MELLITUS WITH HYPERGLYCEMIA, WITHOUT LONG-TERM CURRENT USE OF INSULIN: Chronic | ICD-10-CM

## 2023-01-10 DIAGNOSIS — R17 SERUM TOTAL BILIRUBIN ELEVATED: ICD-10-CM

## 2023-01-10 PROCEDURE — 3060F PR POS MICROALBUMINURIA RESULT DOCUMENTED/REVIEW: ICD-10-PCS | Mod: CPTII,,, | Performed by: STUDENT IN AN ORGANIZED HEALTH CARE EDUCATION/TRAINING PROGRAM

## 2023-01-10 PROCEDURE — 1160F RVW MEDS BY RX/DR IN RCRD: CPT | Mod: CPTII,,, | Performed by: STUDENT IN AN ORGANIZED HEALTH CARE EDUCATION/TRAINING PROGRAM

## 2023-01-10 PROCEDURE — 3079F DIAST BP 80-89 MM HG: CPT | Mod: CPTII,,, | Performed by: STUDENT IN AN ORGANIZED HEALTH CARE EDUCATION/TRAINING PROGRAM

## 2023-01-10 PROCEDURE — 1160F PR REVIEW ALL MEDS BY PRESCRIBER/CLIN PHARMACIST DOCUMENTED: ICD-10-PCS | Mod: CPTII,,, | Performed by: STUDENT IN AN ORGANIZED HEALTH CARE EDUCATION/TRAINING PROGRAM

## 2023-01-10 PROCEDURE — 3008F PR BODY MASS INDEX (BMI) DOCUMENTED: ICD-10-PCS | Mod: CPTII,,, | Performed by: STUDENT IN AN ORGANIZED HEALTH CARE EDUCATION/TRAINING PROGRAM

## 2023-01-10 PROCEDURE — 1159F PR MEDICATION LIST DOCUMENTED IN MEDICAL RECORD: ICD-10-PCS | Mod: CPTII,,, | Performed by: STUDENT IN AN ORGANIZED HEALTH CARE EDUCATION/TRAINING PROGRAM

## 2023-01-10 PROCEDURE — 3079F PR MOST RECENT DIASTOLIC BLOOD PRESSURE 80-89 MM HG: ICD-10-PCS | Mod: CPTII,,, | Performed by: STUDENT IN AN ORGANIZED HEALTH CARE EDUCATION/TRAINING PROGRAM

## 2023-01-10 PROCEDURE — 99214 PR OFFICE/OUTPT VISIT, EST, LEVL IV, 30-39 MIN: ICD-10-PCS | Mod: ,,, | Performed by: STUDENT IN AN ORGANIZED HEALTH CARE EDUCATION/TRAINING PROGRAM

## 2023-01-10 PROCEDURE — 4010F PR ACE/ARB THEARPY RXD/TAKEN: ICD-10-PCS | Mod: CPTII,,, | Performed by: STUDENT IN AN ORGANIZED HEALTH CARE EDUCATION/TRAINING PROGRAM

## 2023-01-10 PROCEDURE — 3066F NEPHROPATHY DOC TX: CPT | Mod: CPTII,,, | Performed by: STUDENT IN AN ORGANIZED HEALTH CARE EDUCATION/TRAINING PROGRAM

## 2023-01-10 PROCEDURE — 1159F MED LIST DOCD IN RCRD: CPT | Mod: CPTII,,, | Performed by: STUDENT IN AN ORGANIZED HEALTH CARE EDUCATION/TRAINING PROGRAM

## 2023-01-10 PROCEDURE — 4010F ACE/ARB THERAPY RXD/TAKEN: CPT | Mod: CPTII,,, | Performed by: STUDENT IN AN ORGANIZED HEALTH CARE EDUCATION/TRAINING PROGRAM

## 2023-01-10 PROCEDURE — 99214 OFFICE O/P EST MOD 30 MIN: CPT | Mod: ,,, | Performed by: STUDENT IN AN ORGANIZED HEALTH CARE EDUCATION/TRAINING PROGRAM

## 2023-01-10 PROCEDURE — 3075F PR MOST RECENT SYSTOLIC BLOOD PRESS GE 130-139MM HG: ICD-10-PCS | Mod: CPTII,,, | Performed by: STUDENT IN AN ORGANIZED HEALTH CARE EDUCATION/TRAINING PROGRAM

## 2023-01-10 PROCEDURE — 3008F BODY MASS INDEX DOCD: CPT | Mod: CPTII,,, | Performed by: STUDENT IN AN ORGANIZED HEALTH CARE EDUCATION/TRAINING PROGRAM

## 2023-01-10 PROCEDURE — 3066F PR DOCUMENTATION OF TREATMENT FOR NEPHROPATHY: ICD-10-PCS | Mod: CPTII,,, | Performed by: STUDENT IN AN ORGANIZED HEALTH CARE EDUCATION/TRAINING PROGRAM

## 2023-01-10 PROCEDURE — 3075F SYST BP GE 130 - 139MM HG: CPT | Mod: CPTII,,, | Performed by: STUDENT IN AN ORGANIZED HEALTH CARE EDUCATION/TRAINING PROGRAM

## 2023-01-10 PROCEDURE — 3060F POS MICROALBUMINURIA REV: CPT | Mod: CPTII,,, | Performed by: STUDENT IN AN ORGANIZED HEALTH CARE EDUCATION/TRAINING PROGRAM

## 2023-01-10 RX ORDER — HYDROCORTISONE 25 MG/G
OINTMENT TOPICAL 2 TIMES DAILY
Qty: 20 G | Refills: 2 | Status: SHIPPED | OUTPATIENT
Start: 2023-01-10 | End: 2023-10-30 | Stop reason: ALTCHOICE

## 2023-01-10 RX ORDER — CLOTRIMAZOLE AND BETAMETHASONE DIPROPIONATE 10; .64 MG/G; MG/G
CREAM TOPICAL
COMMUNITY
Start: 2023-01-05

## 2023-01-10 NOTE — PROGRESS NOTES
Subjective:       Patient ID: Kirstie Christianson is a 52 y.o. female.    Past Medical History:  CHF (NYHA class III, ACC/AHA Stage C)  Diabetes Mellitus II  Disorder of kidney and ureter  Hilar lymphadenopathy  Obesity, unspecified  Pulmonary nodule  Systolic heart failure     Chief Complaint: Obesity, Hypertension, Body itching (Hair line/Leg area -with hive like patches), Rash, and Results    Presents to the clinic for follow up. Overall doing much better. Established with Cardiology again. Repeat Echocardiogram showed recovered EF to >50% per patient. Endorsed 100% compliance with medications. No medication refills at this time. Reviewed results (wellness labs -1/2023), answered all questions and concerns at this time. Due for colon cancer screening. Endorsed rash, itchy in nature, affecting the legs.     Review of Systems   Constitutional:  Negative for chills and fever.   Respiratory:  Negative for cough, shortness of breath and wheezing.    Cardiovascular:  Negative for chest pain, palpitations and leg swelling.   Gastrointestinal:  Negative for abdominal pain, diarrhea, nausea and vomiting.   Genitourinary:  Negative for dysuria and hematuria.   Integumentary:  Positive for rash.   Neurological:  Negative for dizziness and syncope.       Objective:      Physical Exam  Vitals and nursing note reviewed.   Constitutional:       General: She is not in acute distress.     Appearance: Normal appearance. She is not ill-appearing.   Eyes:      General: No scleral icterus.     Extraocular Movements: Extraocular movements intact.      Conjunctiva/sclera: Conjunctivae normal.      Pupils: Pupils are equal, round, and reactive to light.   Cardiovascular:      Rate and Rhythm: Normal rate and regular rhythm.      Pulses: Normal pulses.      Heart sounds: Normal heart sounds. No murmur heard.  Pulmonary:      Effort: Pulmonary effort is normal. No respiratory distress.      Breath sounds: Normal breath sounds. No wheezing.    Abdominal:      General: There is no distension.      Palpations: Abdomen is soft.      Tenderness: There is no abdominal tenderness.   Musculoskeletal:      Right lower leg: No edema.      Left lower leg: No edema.   Skin:     General: Skin is warm.      Coloration: Skin is not jaundiced.      Findings: Rash present. Rash is purpuric.   Neurological:      Mental Status: She is alert. Mental status is at baseline.      Gait: Gait normal.   Psychiatric:         Mood and Affect: Mood normal.         Behavior: Behavior normal.       Assessment & Plan:   1. Primary hypertension  Assessment & Plan:  Today's BP WNL   Continue Coreg, Olmesartan, Norvasc, Lasix   Counseled on low sodium diet, exercise, tobacco avoidance, and limiting alcohol intake   Pt. Has home BP cuff, instructed to measure home BP and report abnormal values      2. Mixed hyperlipidemia  Assessment & Plan:  Last Lipid Panel WNL  ASCVD calculated 10 year risk -moderate to high   Continue Atorvastatin to 80mg daily   Counseled on dietary modifications  Counseled to exercise 150 minutes weekly as exercise raises HDL and lowers LDL   Orders:  -     Comprehensive Metabolic Panel; Future; Expected date: 01/10/2023  -     Bilirubin, Total and Direct; Future; Expected date: 01/10/2023  -     US Abdomen Complete; Future; Expected date: 01/10/2023  -     Hepatitis C Antibody; Future; Expected date: 01/27/2023    3. Colon cancer screening  -     Cologuard Screening (Multitarget Stool DNA); Future; Expected date: 01/10/2023    4. Eczema, unspecified type  -     hydrocortisone 2.5 % ointment; Apply topically 2 (two) times daily.  Dispense: 20 g; Refill: 2    5. Transaminitis  -     Comprehensive Metabolic Panel; Future; Expected date: 01/10/2023  -     Bilirubin, Total and Direct; Future; Expected date: 01/10/2023  -     US Abdomen Complete; Future; Expected date: 01/10/2023  -     Hepatitis C Antibody; Future; Expected date: 01/27/2023    6. Serum total bilirubin  elevated  -     Comprehensive Metabolic Panel; Future; Expected date: 01/10/2023  -     Bilirubin, Total and Direct; Future; Expected date: 01/10/2023  -     Hepatitis C Antibody; Future; Expected date: 01/27/2023    7. Type 2 diabetes mellitus with hyperglycemia, without long-term current use of insulin  Assessment & Plan:  Most recent A1c was 6.3, goal A1c <7.0%   Continue Synjardy XR, and Trulicity   Continue ACE-I and Statin   Continue ADA diet, Counseled on importance of diet & weight loss   Orders:  -     Hemoglobin A1C; Future; Expected date: 04/03/2023    8. Class 2 severe obesity with serious comorbidity and body mass index (BMI) of 37.0 to 37.9 in adult, unspecified obesity type  Assessment & Plan:  Encouraged healthy lifestyle/diet modifications   Exercise 3-5x a week (>30 minutes, including a variety of cardio, weightbearing and lifting exercises)   Eat a well balanced diet comprised of fruit/vegetables, lean protein, and complex carbs  Continue Trulicity weekly injection for both glycemic/weight control    Follow up in about 3 months (around 4/10/2023) for HTN, Obesity, Diabetes, HF. In addition to their scheduled follow up, the patient has also been instructed to follow up on as needed basis.

## 2023-01-18 ENCOUNTER — CLINICAL SUPPORT (OUTPATIENT)
Dept: DIABETES | Facility: CLINIC | Age: 53
End: 2023-01-18
Payer: COMMERCIAL

## 2023-01-18 VITALS — WEIGHT: 196.88 LBS | BODY MASS INDEX: 36.01 KG/M2

## 2023-01-18 DIAGNOSIS — E11.59 TYPE 2 DIABETES MELLITUS WITH OTHER CIRCULATORY COMPLICATION, WITHOUT LONG-TERM CURRENT USE OF INSULIN: Primary | ICD-10-CM

## 2023-01-18 PROCEDURE — G0108 DIAB MANAGE TRN  PER INDIV: HCPCS | Mod: ,,, | Performed by: INTERNAL MEDICINE

## 2023-01-18 PROCEDURE — G0108 PR DIAB MANAGE TRN  PER INDIV: ICD-10-PCS | Mod: ,,, | Performed by: INTERNAL MEDICINE

## 2023-01-18 NOTE — PROGRESS NOTES
Diabetes Care Specialist Progress Note  Author: Lor Benjamin RN  Date: 1/18/2023    Program Intake  Reason for Diabetes Program Visit:: Post Program Follow-Up  Type of Intervention:: Individual  Individual: Education  Education: Self-Management Skill Review  Type of Follow-Up: 3 month  Current diabetes risk level:: low  In the last 12 months, have you:: none    Lab Results   Component Value Date    HGBA1C 6.3 01/06/2023       Clinical                                  Additional Social History                                    Diabetes Self-Management Skills Assessment    Diabetes Disease Process/Treatment Options  Patient/caregiver able to state what happens when someone has diabetes.: yes  Patient/caregiver knows what type of diabetes they have.: yes  Diabetes Type : Type II  Patient/caregiver able to identify at least three signs and symptoms of diabetes.: yes  Identified signs and symptoms:: fatigue, frequent urination, increased thirst  Patient able to identify at least three risk factors for diabetes.: yes  Identified risk factors:: age over 40, being overweight  Diabetes Disease Process/Treatment Options: Skills Assessment Completed: Yes  Assessment indicates:: Adequate understanding  Area of need?: No              Medications  Patient is able to describe current diabetes management routine.: yes  Diabetes management routine:: injectable medications, oral medications (Trulicity 1.5mg on Monday, Synjardy 12.5mg/1000mg daily)  Patient is able to identify current diabetes medications, dosages, and appropriate timing of medications.: yes  Patient understands the purpose of the medications taken for diabetes.: yes  Patient reports problems or concerns with current medication regimen.: no  Medication Skills Assessment Completed:: Yes  Assessment indicates:: Adequate understanding  Area of need?: No    Home Blood Glucose Monitoring  Patient states that blood sugar is checked at home daily.: no  Reasons for not  monitoring:: other (see comments) (States since glucose well controlled she stopped testing)  Assessment indicates:: Other (comment) (Encouraged spot testing FBS to stay on track)    Acute Complications  Patient is able to identify types of acute complications: Yes  Patient Identified:: Hypoglycemia  Patient is able to state the basic meaning of hypoglycemia?: Yes  Able to state the blood sugar range for hypoglycemia?: yes  Patient stated range:: <70  Patient can identify general symptoms of hypoglycemia: yes  Patient identified:: dizziness, shakiness  Able to state proper treatment of hypoglycemia?: yes  Patient identified:: 5-6 pieces of hard candy  Acute Complications Skills Assessment Completed: : Yes  Assessment indicates:: Adequate understanding    Chronic Complications  Patient can identify major chronic complications of diabetes.: yes  Stated chronic complications:: heart disease/heart attack, kidney disease, retinopathy  Patient can identify ways to prevent or delay diabetes complications.: yes  Stated ways to prevent complications:: controlling blood sugar, healthy eating and regular activity  Patient is aware that having diabetes increases risk of heart disease?: Yes  Patient is aware that heart disease is the leading cause of death and disability in people with diabetes?: Yes  Patient able to state risk factors for heart disease?: Yes  Patient stated risk factors for heart disease:: High blood pressure, High cholesterol, Having diabetes  Patient is taking statin?: Yes  Chronic Complications Skills Assessment Completed: : Yes  Assessment indicates:: Adequate understanding  Area of need?: No           Diabetes Self Support Plan         Assessment Summary and Plan    Based on today's diabetes care assessment, the following areas of need were identified:      Social 10/19/2022   Support No   Access to Mass Media/Tech No   Cognitive/Behavioral Health No   Culture/Gnosticism No   Communication No   Health  Literacy No        Clinical 10/19/2022   Medication Adherence No   Lab Compliance No   Nutritional Status No        Diabetes Self-Management Skills 1/18/2023   Diabetes Disease Process/Treatment Options No   Nutrition/Healthy Eating -   Physical Activity/Exercise -   Medication No   Home Blood Glucose Monitoring -   Acute Complications -   Chronic Complications No   Psychosocial/Coping -          Today's interventions were provided through individual discussion, instruction, and written materials were provided.      Patient verbalized understanding of instruction and written materials.  Pt was able to return back demonstration of instructions today. Patient understood key points, needs reinforcement and further instruction.     Diabetes Self-Management Care Plan:    Today's Diabetes Self-Management Care Plan was developed with Kirstie's input. Kirstie has agreed to work toward the following goal(s) to improve his/her overall diabetes control.      Care Plan: Diabetes Management   Updates made since 12/19/2022 12:00 AM        Problem: Healthy Eating         Long-Range Goal: Eat 3 meals daily with 30-45grams/ 2-3 servings of Carbohydrate per meal and add 1-2 non starchy vegetables Completed 1/18/2023   Start Date: 10/19/2022   This Visit's Progress: Met   Priority: High   Barriers: Knowledge deficit   Note:    States she started to add vegetables to meals then they just went to waste.  Encouraged buying one veggie per week, prepping right away and eating until gone then buy another vegetable to avoid waste and create new habit.  She is still drinking some soda but less and swapped with some crystal lite.  She is cutting back on carbs and has added more veggies. A1C down to 6.3 and weight down 10lbs since 10/19/2022       Problem: Blood Glucose Self-Monitoring         Long-Range Goal: Patient agrees to check and record blood sugars 1-2per times per day. Completed 11/16/2022   Start Date: 10/19/2022   Expected End  Date: 11/16/2022   This Visit's Progress: Met   Priority: Medium   Barriers: Knowledge deficit   Note:    She is testing fbs only encouraged spot testing in evening. Discussed timing and goals of glucose.        Problem: Physical Activity and Exercise         Long-Range Goal: Patient agrees to increase physical activity to a goal of 4 times per week for  15 minutes. Completed 1/18/2023   Start Date: 11/16/2022   This Visit's Progress: Met   Priority: Medium   Barriers: Lack of Motivation to Change   Note:    She is walking and doing more around the house.  She will also start doing exercise to strengthen back       Problem: Chronic Complications         Long-Range Goal: Patient agrees to keep PCP appointments to monitor chronic diagnosis and do screenings prescribed by pcp. Completed 1/18/2023   Start Date: 11/16/2022   This Visit's Progress: Met   Priority: Low   Barriers: Knowledge deficit   Note:    Has Pcp appt in January of 2023  Reviewed diabetes complications and preventative screenings including annual dilated eye exams, regular dental exams, and daily foot self-exams. Reviewed foot care guidelines.   Discussed current A1c, Blood Pressure, and Cholesterol results (ABC's of Diabetes) and ADA target guidelines.        Task: Discussed the importance of routine dental exams for the prevention of gum disease and gingivitis and encouraged dental exam every 6 months. Completed 1/18/2023        Task: Instructed on basic daily foot care and encouraged inspecting feet daily. Completed 1/18/2023          Follow Up Plan     Follow up if symptoms worsen or fail to improve.    Today's care plan and follow up schedule was discussed with patient.  Kirstie verbalized understanding of the care plan, goals, and agrees to follow up plan.        The patient was encouraged to communicate with his/her health care provider/physician and care team regarding his/her condition(s) and treatment.  I provided the patient with my contact  information today and encouraged to contact me via phone or [facility] Patient Portal as needed.     Length of Visit   Total Time: 30 Minutes Diabetes Care Specialist Progress Note  Author: Lor Benjamin RN  Date: 1/18/2023    Program Intake  Reason for Diabetes Program Visit:: Post Program Follow-Up  Type of Intervention:: Individual  Individual: Education  Education: Self-Management Skill Review  Type of Follow-Up: 3 month  Current diabetes risk level:: low  In the last 12 months, have you:: none    Lab Results   Component Value Date    HGBA1C 6.3 01/06/2023       Clinical                                  Additional Social History                                    Diabetes Self-Management Skills Assessment    Diabetes Disease Process/Treatment Options  Patient/caregiver able to state what happens when someone has diabetes.: yes  Patient/caregiver knows what type of diabetes they have.: yes  Diabetes Type : Type II  Patient/caregiver able to identify at least three signs and symptoms of diabetes.: yes  Identified signs and symptoms:: fatigue, frequent urination, increased thirst  Patient able to identify at least three risk factors for diabetes.: yes  Identified risk factors:: age over 40, being overweight  Diabetes Disease Process/Treatment Options: Skills Assessment Completed: Yes  Assessment indicates:: Adequate understanding  Area of need?: No              Medications  Patient is able to describe current diabetes management routine.: yes  Diabetes management routine:: injectable medications, oral medications (Trulicity 1.5mg on Monday, Synjardy 12.5mg/1000mg daily)  Patient is able to identify current diabetes medications, dosages, and appropriate timing of medications.: yes  Patient understands the purpose of the medications taken for diabetes.: yes  Patient reports problems or concerns with current medication regimen.: no  Medication Skills Assessment Completed:: Yes  Assessment indicates:: Adequate  understanding  Area of need?: No    Home Blood Glucose Monitoring  Patient states that blood sugar is checked at home daily.: no  Reasons for not monitoring:: other (see comments) (States since glucose well controlled she stopped testing)  Assessment indicates:: Other (comment) (Encouraged spot testing FBS to stay on track)    Acute Complications  Patient is able to identify types of acute complications: Yes  Patient Identified:: Hypoglycemia  Patient is able to state the basic meaning of hypoglycemia?: Yes  Able to state the blood sugar range for hypoglycemia?: yes  Patient stated range:: <70  Patient can identify general symptoms of hypoglycemia: yes  Patient identified:: dizziness, shakiness  Able to state proper treatment of hypoglycemia?: yes  Patient identified:: 5-6 pieces of hard candy  Acute Complications Skills Assessment Completed: : Yes  Assessment indicates:: Adequate understanding    Chronic Complications  Patient can identify major chronic complications of diabetes.: yes  Stated chronic complications:: heart disease/heart attack, kidney disease, retinopathy  Patient can identify ways to prevent or delay diabetes complications.: yes  Stated ways to prevent complications:: controlling blood sugar, healthy eating and regular activity  Patient is aware that having diabetes increases risk of heart disease?: Yes  Patient is aware that heart disease is the leading cause of death and disability in people with diabetes?: Yes  Patient able to state risk factors for heart disease?: Yes  Patient stated risk factors for heart disease:: High blood pressure, High cholesterol, Having diabetes  Patient is taking statin?: Yes  Chronic Complications Skills Assessment Completed: : Yes  Assessment indicates:: Adequate understanding  Area of need?: No           Diabetes Self Support Plan         Assessment Summary and Plan    Based on today's diabetes care assessment, the following areas of need were identified:       Social 10/19/2022   Support No   Access to Mass Media/Tech No   Cognitive/Behavioral Health No   Culture/Sikh No   Communication No   Health Literacy No        Clinical 10/19/2022   Medication Adherence No   Lab Compliance No   Nutritional Status No        Diabetes Self-Management Skills 1/18/2023   Diabetes Disease Process/Treatment Options No   Nutrition/Healthy Eating -   Physical Activity/Exercise -   Medication No   Home Blood Glucose Monitoring -   Acute Complications -   Chronic Complications No   Psychosocial/Coping -          Today's interventions were provided through individual discussion, instruction, and written materials were provided.      Patient verbalized understanding of instruction and written materials.  Pt was able to return back demonstration of instructions today. Patient understood key points, needs reinforcement and further instruction.     Diabetes Self-Management Care Plan:    Today's Diabetes Self-Management Care Plan was developed with Kirstie's input. Kirstie has agreed to work toward the following goal(s) to improve his/her overall diabetes control.      Care Plan: Diabetes Management   Updates made since 12/19/2022 12:00 AM        Problem: Healthy Eating         Long-Range Goal: Eat 3 meals daily with 30-45grams/ 2-3 servings of Carbohydrate per meal and add 1-2 non starchy vegetables Completed 1/18/2023   Start Date: 10/19/2022   This Visit's Progress: Met   Priority: High   Barriers: Knowledge deficit   Note:    States she started to add vegetables to meals then they just went to waste.  Encouraged buying one veggie per week, prepping right away and eating until gone then buy another vegetable to avoid waste and create new habit.  She is still drinking some soda but less and swapped with some crystal lite.  She is cutting back on carbs and has added more veggies. A1C down to 6.3 and weight down 10lbs since 10/19/2022       Problem: Blood Glucose Self-Monitoring          Long-Range Goal: Patient agrees to check and record blood sugars 1-2per times per day. Completed 11/16/2022   Start Date: 10/19/2022   Expected End Date: 11/16/2022   This Visit's Progress: Met   Priority: Medium   Barriers: Knowledge deficit   Note:    She is testing fbs only encouraged spot testing in evening. Discussed timing and goals of glucose.        Problem: Physical Activity and Exercise         Long-Range Goal: Patient agrees to increase physical activity to a goal of 4 times per week for  15 minutes. Completed 1/18/2023   Start Date: 11/16/2022   This Visit's Progress: Met   Priority: Medium   Barriers: Lack of Motivation to Change   Note:    She is walking and doing more around the house.  She will also start doing exercise to strengthen back       Problem: Chronic Complications         Long-Range Goal: Patient agrees to keep PCP appointments to monitor chronic diagnosis and do screenings prescribed by pcp. Completed 1/18/2023   Start Date: 11/16/2022   This Visit's Progress: Met   Priority: Low   Barriers: Knowledge deficit   Note:    Has Pcp appt in January of 2023  Reviewed diabetes complications and preventative screenings including annual dilated eye exams, regular dental exams, and daily foot self-exams. Reviewed foot care guidelines.   Discussed current A1c, Blood Pressure, and Cholesterol results (ABC's of Diabetes) and ADA target guidelines.        Task: Discussed the importance of routine dental exams for the prevention of gum disease and gingivitis and encouraged dental exam every 6 months. Completed 1/18/2023        Task: Instructed on basic daily foot care and encouraged inspecting feet daily. Completed 1/18/2023          Follow Up Plan     Follow up if symptoms worsen or fail to improve.     Today's care plan and follow up schedule was discussed with patient.  Kirstie verbalized understanding of the care plan, goals, and agrees to follow up plan.        The patient was encouraged to  communicate with his/her health care provider/physician and care team regarding his/her condition(s) and treatment.  I provided the patient with my contact information today and encouraged to contact me via phone or [facility] Patient Portal as needed.     Length of Visit   Total Time: 30 Minutes

## 2023-01-28 PROBLEM — E11.59 TYPE 2 DIABETES MELLITUS WITH CIRCULATORY DISORDER, WITHOUT LONG-TERM CURRENT USE OF INSULIN: Chronic | Status: ACTIVE | Noted: 2022-10-10

## 2023-01-28 NOTE — ASSESSMENT & PLAN NOTE
Encouraged healthy lifestyle/diet modifications   Exercise 3-5x a week (>30 minutes, including a variety of cardio, weightbearing and lifting exercises)   Eat a well balanced diet comprised of fruit/vegetables, lean protein, and complex carbs  Continue Trulicity weekly injection for both glycemic/weight control

## 2023-01-28 NOTE — ASSESSMENT & PLAN NOTE
Most recent A1c was 6.3, goal A1c <7.0%   Continue Synjardy XR, and Trulicity   Continue ACE-I and Statin   Continue ADA diet, Counseled on importance of diet & weight loss

## 2023-02-24 ENCOUNTER — TELEPHONE (OUTPATIENT)
Dept: PRIMARY CARE CLINIC | Facility: CLINIC | Age: 53
End: 2023-02-24
Payer: COMMERCIAL

## 2023-02-24 NOTE — TELEPHONE ENCOUNTER
----- Message from Sarahi Stevens sent at 2/24/2023 10:40 AM CST -----  Regarding: advice  Type:  Needs Medical Advice    Who Called: cora from [employer]  Symptoms (please be specific):    How long has patient had these symptoms:    Pharmacy name and phone #:    Would the patient rather a call back or a response via [facility]?   Best Call Back Number: [phone]  Additional Information: Cora stated that she received a sample for the pt but didn't get a date for when the sample was taken. She stated that she tried to contact pt but no luck and wanted to know if there is a way for the nurse to get in contact with pt about that date and give her a call back

## 2023-02-27 ENCOUNTER — OFFICE VISIT (OUTPATIENT)
Dept: PRIMARY CARE CLINIC | Facility: CLINIC | Age: 53
End: 2023-02-27
Payer: COMMERCIAL

## 2023-02-27 VITALS
DIASTOLIC BLOOD PRESSURE: 79 MMHG | TEMPERATURE: 98 F | HEIGHT: 62 IN | BODY MASS INDEX: 36.25 KG/M2 | SYSTOLIC BLOOD PRESSURE: 125 MMHG | WEIGHT: 197 LBS | OXYGEN SATURATION: 98 % | RESPIRATION RATE: 20 BRPM | HEART RATE: 84 BPM

## 2023-02-27 DIAGNOSIS — M51.36 DDD (DEGENERATIVE DISC DISEASE), LUMBAR: ICD-10-CM

## 2023-02-27 DIAGNOSIS — M54.16 LUMBAR BACK PAIN WITH RADICULOPATHY AFFECTING LOWER EXTREMITY: Primary | Chronic | ICD-10-CM

## 2023-02-27 DIAGNOSIS — E11.43 TYPE II DIABETES MELLITUS WITH PERIPHERAL AUTONOMIC NEUROPATHY: Chronic | ICD-10-CM

## 2023-02-27 DIAGNOSIS — M50.30 DDD (DEGENERATIVE DISC DISEASE), CERVICAL: ICD-10-CM

## 2023-02-27 DIAGNOSIS — M54.12 CERVICAL RADICULOPATHY: ICD-10-CM

## 2023-02-27 PROBLEM — M51.369 DDD (DEGENERATIVE DISC DISEASE), LUMBAR: Status: ACTIVE | Noted: 2023-02-27

## 2023-02-27 LAB — NONINV COLON CA DNA+OCC BLD SCRN STL QL: NORMAL

## 2023-02-27 PROCEDURE — 1160F PR REVIEW ALL MEDS BY PRESCRIBER/CLIN PHARMACIST DOCUMENTED: ICD-10-PCS | Mod: CPTII,,, | Performed by: STUDENT IN AN ORGANIZED HEALTH CARE EDUCATION/TRAINING PROGRAM

## 2023-02-27 PROCEDURE — 3078F PR MOST RECENT DIASTOLIC BLOOD PRESSURE < 80 MM HG: ICD-10-PCS | Mod: CPTII,,, | Performed by: STUDENT IN AN ORGANIZED HEALTH CARE EDUCATION/TRAINING PROGRAM

## 2023-02-27 PROCEDURE — 1159F MED LIST DOCD IN RCRD: CPT | Mod: CPTII,,, | Performed by: STUDENT IN AN ORGANIZED HEALTH CARE EDUCATION/TRAINING PROGRAM

## 2023-02-27 PROCEDURE — 99214 OFFICE O/P EST MOD 30 MIN: CPT | Mod: ,,, | Performed by: STUDENT IN AN ORGANIZED HEALTH CARE EDUCATION/TRAINING PROGRAM

## 2023-02-27 PROCEDURE — 3060F POS MICROALBUMINURIA REV: CPT | Mod: CPTII,,, | Performed by: STUDENT IN AN ORGANIZED HEALTH CARE EDUCATION/TRAINING PROGRAM

## 2023-02-27 PROCEDURE — 3060F PR POS MICROALBUMINURIA RESULT DOCUMENTED/REVIEW: ICD-10-PCS | Mod: CPTII,,, | Performed by: STUDENT IN AN ORGANIZED HEALTH CARE EDUCATION/TRAINING PROGRAM

## 2023-02-27 PROCEDURE — 1159F PR MEDICATION LIST DOCUMENTED IN MEDICAL RECORD: ICD-10-PCS | Mod: CPTII,,, | Performed by: STUDENT IN AN ORGANIZED HEALTH CARE EDUCATION/TRAINING PROGRAM

## 2023-02-27 PROCEDURE — 1160F RVW MEDS BY RX/DR IN RCRD: CPT | Mod: CPTII,,, | Performed by: STUDENT IN AN ORGANIZED HEALTH CARE EDUCATION/TRAINING PROGRAM

## 2023-02-27 PROCEDURE — 3074F SYST BP LT 130 MM HG: CPT | Mod: CPTII,,, | Performed by: STUDENT IN AN ORGANIZED HEALTH CARE EDUCATION/TRAINING PROGRAM

## 2023-02-27 PROCEDURE — 3008F BODY MASS INDEX DOCD: CPT | Mod: CPTII,,, | Performed by: STUDENT IN AN ORGANIZED HEALTH CARE EDUCATION/TRAINING PROGRAM

## 2023-02-27 PROCEDURE — 3078F DIAST BP <80 MM HG: CPT | Mod: CPTII,,, | Performed by: STUDENT IN AN ORGANIZED HEALTH CARE EDUCATION/TRAINING PROGRAM

## 2023-02-27 PROCEDURE — 3066F PR DOCUMENTATION OF TREATMENT FOR NEPHROPATHY: ICD-10-PCS | Mod: CPTII,,, | Performed by: STUDENT IN AN ORGANIZED HEALTH CARE EDUCATION/TRAINING PROGRAM

## 2023-02-27 PROCEDURE — 99214 PR OFFICE/OUTPT VISIT, EST, LEVL IV, 30-39 MIN: ICD-10-PCS | Mod: ,,, | Performed by: STUDENT IN AN ORGANIZED HEALTH CARE EDUCATION/TRAINING PROGRAM

## 2023-02-27 PROCEDURE — 4010F PR ACE/ARB THEARPY RXD/TAKEN: ICD-10-PCS | Mod: CPTII,,, | Performed by: STUDENT IN AN ORGANIZED HEALTH CARE EDUCATION/TRAINING PROGRAM

## 2023-02-27 PROCEDURE — 4010F ACE/ARB THERAPY RXD/TAKEN: CPT | Mod: CPTII,,, | Performed by: STUDENT IN AN ORGANIZED HEALTH CARE EDUCATION/TRAINING PROGRAM

## 2023-02-27 PROCEDURE — 3008F PR BODY MASS INDEX (BMI) DOCUMENTED: ICD-10-PCS | Mod: CPTII,,, | Performed by: STUDENT IN AN ORGANIZED HEALTH CARE EDUCATION/TRAINING PROGRAM

## 2023-02-27 PROCEDURE — 3066F NEPHROPATHY DOC TX: CPT | Mod: CPTII,,, | Performed by: STUDENT IN AN ORGANIZED HEALTH CARE EDUCATION/TRAINING PROGRAM

## 2023-02-27 PROCEDURE — 3074F PR MOST RECENT SYSTOLIC BLOOD PRESSURE < 130 MM HG: ICD-10-PCS | Mod: CPTII,,, | Performed by: STUDENT IN AN ORGANIZED HEALTH CARE EDUCATION/TRAINING PROGRAM

## 2023-02-27 RX ORDER — GABAPENTIN 600 MG/1
600 TABLET ORAL 2 TIMES DAILY
Qty: 60 TABLET | Refills: 11 | Status: SHIPPED | OUTPATIENT
Start: 2023-02-27 | End: 2023-05-30 | Stop reason: SDUPTHER

## 2023-02-27 NOTE — PROGRESS NOTES
Please let Ms. Thacker know that her sample to [facility] could not be process.  They should of reach out to her to initiate a new sample collection.    Thanks,  Marie Brambila MD

## 2023-02-27 NOTE — PROGRESS NOTES
Subjective:       Patient ID: Kirstie Christianson is a 52 y.o. female.    Past Medical History:   CHF (NYHA class III, ACC/AHA Stage C)  Diabetes Mellitus II  Disorder of kidney and ureter  Hilar lymphadenopathy  Obesity, unspecified  Pulmonary nodule  Systolic heart failure     Chief Complaint: Foot Pain    Patient presents to the clinic for follow-up.  Endorsed worsening bilateral hand and foot pain.  Described as numbness and tingling, shooting down.  Endorsed 100% compliance with her 300 mg twice a day with gabapentin.  Feels like it was working originally but now not working very well.  She has tried conservative therapies such as a course of over-the-counter pain medications, and physical therapy.  Feels like her symptoms have not really improved since doing physical therapy.  States that her  strength is weakening, she is more prone to dropping objects in her hands.  She has also noticed overall weakness in her legs bilaterally.  X-ray of the lumbar spine had originally showed degenerative disc disease.  Patient would like further evaluation and possible referral for interventions at this time.  She endorsed entry present compliance with her diabetes medication.  Last known A1c was 6.3.  Foot exam was performed at today's visit.  Showed recovered peripheral neuropathy sensation.    Review of Systems   Constitutional:  Negative for chills and fever.   Respiratory:  Negative for cough and shortness of breath.    Cardiovascular:  Negative for chest pain, palpitations, leg swelling and claudication.   Gastrointestinal:  Negative for diarrhea, nausea and vomiting.   Genitourinary:  Negative for bladder incontinence, dysuria and hematuria.   Musculoskeletal:  Positive for back pain, leg pain and neck pain.   Neurological:  Positive for weakness and numbness. Negative for syncope.       Objective:      Physical Exam  Vitals and nursing note reviewed.   Constitutional:       General: She is not in acute distress.      Appearance: Normal appearance. She is not ill-appearing.   Eyes:      General: No scleral icterus.     Extraocular Movements: Extraocular movements intact.      Conjunctiva/sclera: Conjunctivae normal.      Pupils: Pupils are equal, round, and reactive to light.   Cardiovascular:      Rate and Rhythm: Normal rate and regular rhythm.      Pulses: Normal pulses.      Heart sounds: Normal heart sounds. No murmur heard.  Pulmonary:      Effort: Pulmonary effort is normal. No respiratory distress.      Breath sounds: Normal breath sounds. No wheezing.   Abdominal:      General: There is no distension.      Palpations: Abdomen is soft.      Tenderness: There is no abdominal tenderness.   Musculoskeletal:      Right lower leg: No edema.      Left lower leg: No edema.   Skin:     General: Skin is warm.      Coloration: Skin is not jaundiced.   Neurological:      Mental Status: She is alert. Mental status is at baseline.      Motor: Weakness present.      Gait: Gait normal.   Psychiatric:         Mood and Affect: Mood normal.         Behavior: Behavior normal.       Protective Sensation (w/ 10 gram monofilament):  Right: Intact  Left: Intact    Visual Inspection:  Normal -  Bilateral    Pedal Pulses:   Right: Present  Left: Present    Posterior Tibialis Pulses:   Right:Present  Left: Present    Assessment & Plan:   1. Lumbar back pain with radiculopathy affecting lower extremity  Assessment & Plan:  Worsening  Increase gabapentin to 600 mg b.i.d.  X-ray of the lumbar spine showed degenerative changes, failed conservative therapy and physical therapy completed multiple weeks without significant improvement   Ordered MRI and referred to Neurosurgery for further possible intervention  Continue to tylenol as needed, Voltaren gel as needed  Orders:  -     MRI Lumbar Spine Without Contrast; Future; Expected date: 02/27/2023  -     gabapentin (NEURONTIN) 600 MG tablet; Take 1 tablet (600 mg total) by mouth 2 (two) times daily.   Dispense: 60 tablet; Refill: 11  -     Ambulatory referral/consult to Neurosurgery; Future; Expected date: 02/28/2023    2. DDD (degenerative disc disease), lumbar  Comments:  See above for further details   Failure conservative therapy, obtain further imaging and referred to Neurosurgery for possible intervention  Orders:  -     MRI Lumbar Spine Without Contrast; Future; Expected date: 02/27/2023  -     gabapentin (NEURONTIN) 600 MG tablet; Take 1 tablet (600 mg total) by mouth 2 (two) times daily.  Dispense: 60 tablet; Refill: 11    3. Cervical radiculopathy  Assessment & Plan:  Worsening  Physical therapy nor conservative pain management has worked.  We will obtain x-ray along with MRI for further evaluation.  Referred to Neurosurgery for further evaluation.    Orders:  -      X-Ray cervical Complete; Future; Expected date: 02/27/2023  -     MRI Cervical Spine Without Contrast; Future; Expected date: 02/28/2023  -     Ambulatory referral/consult to Neurosurgery; Future; Expected date: 02/28/2023    4. DDD (degenerative disc disease), cervical  Comments:  See above for further details   Failed conservative therapy, obtain further imaging and referred to Neurosurgery for possible intervention  Orders:  -     MRI Cervical Spine Without Contrast; Future; Expected date: 02/28/2023  -     Ambulatory referral/consult to Neurosurgery; Future; Expected date: 02/28/2023    5. Type II diabetes mellitus with peripheral autonomic neuropathy  Assessment & Plan:  Most recent A1c was 6.3, goal A1c <7.0%   Continue Synjardy XR, and Trulicity   Continue ACE-I and Statin   Continue ADA diet, Counseled on importance of diet & weight loss   Foot exam was performed, no signs of further neuropathy   Increase gabapentin to 600 mg b.i.d., suspect her symptoms are originating from her spine and not due to diabetes    Follow up in about 3 months (around 5/27/2023). In addition to their scheduled follow up, the patient has also been  instructed to follow up on as needed basis.

## 2023-02-28 DIAGNOSIS — Z12.11 COLON CANCER SCREENING: Primary | ICD-10-CM

## 2023-02-28 PROBLEM — M51.36 DDD (DEGENERATIVE DISC DISEASE), LUMBAR: Chronic | Status: ACTIVE | Noted: 2023-02-27

## 2023-02-28 PROBLEM — M54.12 CERVICAL RADICULOPATHY: Chronic | Status: ACTIVE | Noted: 2023-02-27

## 2023-02-28 PROBLEM — M51.369 DDD (DEGENERATIVE DISC DISEASE), LUMBAR: Chronic | Status: ACTIVE | Noted: 2023-02-27

## 2023-02-28 NOTE — ASSESSMENT & PLAN NOTE
Worsening  Increase gabapentin to 600 mg b.i.d.  X-ray of the lumbar spine showed degenerative changes, failed conservative therapy and physical therapy completed multiple weeks without significant improvement   Ordered MRI and referred to Neurosurgery for further possible intervention  Continue to tylenol as needed, Voltaren gel as needed

## 2023-02-28 NOTE — ASSESSMENT & PLAN NOTE
Worsening  Physical therapy nor conservative pain management has worked.  We will obtain x-ray along with MRI for further evaluation.  Referred to Neurosurgery for further evaluation.

## 2023-02-28 NOTE — ASSESSMENT & PLAN NOTE
Most recent A1c was 6.3, goal A1c <7.0%   Continue Synjardy XR, and Trulicity   Continue ACE-I and Statin   Continue ADA diet, Counseled on importance of diet & weight loss   Foot exam was performed, no signs of further neuropathy   Increase gabapentin to 600 mg b.i.d., suspect her symptoms are originating from her spine and not due to diabetes

## 2023-03-06 ENCOUNTER — DOCUMENTATION ONLY (OUTPATIENT)
Dept: ADMINISTRATIVE | Facility: HOSPITAL | Age: 53
End: 2023-03-06
Payer: COMMERCIAL

## 2023-03-07 ENCOUNTER — TELEPHONE (OUTPATIENT)
Dept: PRIMARY CARE CLINIC | Facility: CLINIC | Age: 53
End: 2023-03-07
Payer: COMMERCIAL

## 2023-03-07 DIAGNOSIS — M54.12 CERVICAL RADICULOPATHY: Chronic | ICD-10-CM

## 2023-03-07 DIAGNOSIS — M54.16 LUMBAR BACK PAIN WITH RADICULOPATHY AFFECTING LOWER EXTREMITY: Chronic | ICD-10-CM

## 2023-03-07 DIAGNOSIS — M51.36 DDD (DEGENERATIVE DISC DISEASE), LUMBAR: Primary | Chronic | ICD-10-CM

## 2023-03-07 RX ORDER — METHOCARBAMOL 750 MG/1
750 TABLET, FILM COATED ORAL 3 TIMES DAILY
Qty: 90 TABLET | Refills: 2 | Status: SHIPPED | OUTPATIENT
Start: 2023-03-07 | End: 2023-03-10

## 2023-03-07 NOTE — TELEPHONE ENCOUNTER
Sent in Robaxin 750mg TID as needed. Recommend taking at nighttime first as it may cause drowsiness.     Hopefully this helps!     Marie Brambila MD

## 2023-03-07 NOTE — TELEPHONE ENCOUNTER
----- Message from Princess Cooper sent at 3/7/2023  3:48 PM CST -----  Regarding: Muscle Relaxer  Pt states she was thinking, would you think a muscle relaxer would do her any justice with all the pain she's having in her hands. She wouldn't take it all the time but only when she's really in pain and can't deal with it.     Please Advise, Thanks.     Pharmacy- [facility]  Call Back #- [phone]

## 2023-03-10 ENCOUNTER — TELEPHONE (OUTPATIENT)
Dept: PRIMARY CARE CLINIC | Facility: CLINIC | Age: 53
End: 2023-03-10
Payer: COMMERCIAL

## 2023-03-10 DIAGNOSIS — M51.36 DDD (DEGENERATIVE DISC DISEASE), LUMBAR: Chronic | ICD-10-CM

## 2023-03-10 DIAGNOSIS — M54.12 CERVICAL RADICULOPATHY: Chronic | ICD-10-CM

## 2023-03-10 DIAGNOSIS — M54.16 LUMBAR BACK PAIN WITH RADICULOPATHY AFFECTING LOWER EXTREMITY: Primary | Chronic | ICD-10-CM

## 2023-03-10 RX ORDER — TIZANIDINE 4 MG/1
4 TABLET ORAL EVERY 8 HOURS
Qty: 30 TABLET | Refills: 0 | Status: SHIPPED | OUTPATIENT
Start: 2023-03-10 | End: 2023-03-20

## 2023-03-10 NOTE — TELEPHONE ENCOUNTER
I sent in to Tizanidine 4 mg 3 times a day as needed.  Patient can increase to 8 mg 3 times a day as needed as tolerating and for symptomatic relief.    Thanks,   Marie Brambila MD

## 2023-03-10 NOTE — TELEPHONE ENCOUNTER
----- Message from Bharat Riley MA sent at 3/10/2023 10:36 AM CST -----  Regarding: FW: Med Advice    ----- Message -----  From: Orly Watters  Sent: 3/10/2023   9:37 AM CST  To: [facility] Staff  Subject: Med Advice                                       .Type:  Needs Medical Advice    Who Called: pt  Symptoms (please be specific): body pain   How long has patient had these symptoms:  1wk   Pharmacy name and phone #:  [facility].  Would the patient rather a call back or a response via [facility]?   Best Call Back Number: [phone]  Additional Information: pt states that the methocarbamoL (ROBAXIN) 750 MG Tab, doesn't work for her, she states she took it last night and no pain relief just made her sleepy, please advise

## 2023-03-10 NOTE — TELEPHONE ENCOUNTER
----- Message from Orly Watters sent at 3/10/2023  9:34 AM CST -----  Regarding: Med Advice  .Type:  Needs Medical Advice    Who Called: pt  Symptoms (please be specific): body pain   How long has patient had these symptoms:  1wk   Pharmacy name and phone #:  [facility].  Would the patient rather a call back or a response via [facility]?   Best Call Back Number: [phone]  Additional Information: pt states that the methocarbamoL (ROBAXIN) 750 MG Tab, doesn't work for her, she states she took it last night and no pain relief just made her sleepy, please advise

## 2023-03-10 NOTE — TELEPHONE ENCOUNTER
"Spoke to patient.  Only took 1 pill last night. Started  yesterday reports to be hurting mor than normal.    Look up other muscle relaxer.  "Tiz" may work  Did not have complete spelling or name.       "

## 2023-03-13 ENCOUNTER — TELEPHONE (OUTPATIENT)
Dept: NEUROSURGERY | Facility: CLINIC | Age: 53
End: 2023-03-13
Payer: COMMERCIAL

## 2023-03-13 NOTE — TELEPHONE ENCOUNTER
Received referral. Tried to call patient to get a little more info, no answer and was unable to leave a VM. I do see that she has an upcoming MRI scheduled for 3/21/23. Sent myself a reminder to f/u on results.

## 2023-03-22 NOTE — TELEPHONE ENCOUNTER
Looks like patient did not go for MRI on 3/21/23. Tried to call patient to see if she was planning to r/s, no answer and was unable to leave a vm.

## 2023-03-23 ENCOUNTER — PATIENT OUTREACH (OUTPATIENT)
Dept: ADMINISTRATIVE | Facility: HOSPITAL | Age: 53
End: 2023-03-23
Payer: COMMERCIAL

## 2023-03-23 ENCOUNTER — TELEPHONE (OUTPATIENT)
Dept: PRIMARY CARE CLINIC | Facility: CLINIC | Age: 53
End: 2023-03-23
Payer: COMMERCIAL

## 2023-03-23 RX ORDER — TIZANIDINE 4 MG/1
4 TABLET ORAL EVERY 8 HOURS PRN
Qty: 30 TABLET | Refills: 0 | Status: CANCELLED | OUTPATIENT
Start: 2023-03-23 | End: 2023-04-02

## 2023-03-23 NOTE — TELEPHONE ENCOUNTER
Called pharmacy they are unsure if it was the pt who requested more Tizanidine, however, pt picked up rx for 30 tablets on 3/15/23.     Reached out to the pt, unavailable and unable to leave .

## 2023-03-29 LAB — NONINV COLON CA DNA+OCC BLD SCRN STL QL: NEGATIVE

## 2023-03-31 NOTE — TELEPHONE ENCOUNTER
Tried to call patient again to inquire about MRI, no answer and unable to leave a vm. Also tried to reach support person, no answer and was unable to leave a vm.

## 2023-04-04 ENCOUNTER — TELEPHONE (OUTPATIENT)
Dept: PRIMARY CARE CLINIC | Facility: CLINIC | Age: 53
End: 2023-04-04
Payer: COMMERCIAL

## 2023-04-04 NOTE — TELEPHONE ENCOUNTER
I spoke with Kathryn at Dr. Brambila's office. I did advise to her that I have been trying to call the patient regarding MRI and referral. She states its looking like she cancelled MRI due to financial reasons. She states she will send a message over to the nurse to inform her.

## 2023-04-04 NOTE — TELEPHONE ENCOUNTER
Tried to call patient again, no answer and was unable to leave a vm. This is my 4th attempt, will let referring MD know.

## 2023-04-04 NOTE — TELEPHONE ENCOUNTER
----- Message from Orly Watters sent at 4/4/2023  7:58 AM CDT -----  Regarding: General Message  General Message:      Clement jaquez/ Yara jaquez/ Dr. Franklin German's office @ [phone], she stated that she's being tried calling this patient 3 or more times and have been unsuccessful in reaching her.

## 2023-04-05 NOTE — TELEPHONE ENCOUNTER
Patient called and spoke with Rayne this morning. States she cannot have MRI due to financial issues so I tried to call patient to discuss this with her, no answer and was unable to leave a vm.

## 2023-04-25 ENCOUNTER — TELEPHONE (OUTPATIENT)
Dept: PRIMARY CARE CLINIC | Facility: CLINIC | Age: 53
End: 2023-04-25
Payer: COMMERCIAL

## 2023-04-25 DIAGNOSIS — I10 PRIMARY HYPERTENSION: ICD-10-CM

## 2023-04-25 DIAGNOSIS — I50.20 HEART FAILURE WITH REDUCED EJECTION FRACTION: ICD-10-CM

## 2023-04-25 RX ORDER — OLMESARTAN MEDOXOMIL 20 MG/1
40 TABLET ORAL DAILY
Qty: 30 TABLET | Refills: 2 | Status: SHIPPED | OUTPATIENT
Start: 2023-04-25 | End: 2023-04-26 | Stop reason: SDUPTHER

## 2023-04-25 NOTE — TELEPHONE ENCOUNTER
----- Message from Jimena Melissa sent at 4/25/2023  1:36 PM CDT -----  Regarding: call back  .Type:  Needs Medical Advice    Who Called: pt  Symptoms (please be specific):    How long has patient had these symptoms:    Pharmacy name and phone #:    Would the patient rather a call back or a response via [facility]? Call back  Best Call Back Number: [phone]  Additional Information: pt is requesting a call back about a meds ABL no answer

## 2023-04-26 DIAGNOSIS — I50.20 HEART FAILURE WITH REDUCED EJECTION FRACTION: ICD-10-CM

## 2023-04-26 DIAGNOSIS — I10 PRIMARY HYPERTENSION: ICD-10-CM

## 2023-04-26 RX ORDER — OLMESARTAN MEDOXOMIL 20 MG/1
40 TABLET ORAL DAILY
Qty: 180 TABLET | Refills: 2 | Status: SHIPPED | OUTPATIENT
Start: 2023-04-26 | End: 2023-09-05

## 2023-05-30 ENCOUNTER — OFFICE VISIT (OUTPATIENT)
Dept: PRIMARY CARE CLINIC | Facility: CLINIC | Age: 53
End: 2023-05-30
Payer: COMMERCIAL

## 2023-05-30 ENCOUNTER — LAB VISIT (OUTPATIENT)
Dept: LAB | Facility: HOSPITAL | Age: 53
End: 2023-05-30
Attending: STUDENT IN AN ORGANIZED HEALTH CARE EDUCATION/TRAINING PROGRAM
Payer: COMMERCIAL

## 2023-05-30 VITALS
RESPIRATION RATE: 20 BRPM | HEART RATE: 98 BPM | SYSTOLIC BLOOD PRESSURE: 105 MMHG | HEIGHT: 62 IN | OXYGEN SATURATION: 98 % | BODY MASS INDEX: 35.51 KG/M2 | TEMPERATURE: 98 F | DIASTOLIC BLOOD PRESSURE: 76 MMHG | WEIGHT: 193 LBS

## 2023-05-30 DIAGNOSIS — M54.16 LUMBAR BACK PAIN WITH RADICULOPATHY AFFECTING LOWER EXTREMITY: Chronic | ICD-10-CM

## 2023-05-30 DIAGNOSIS — E11.43 TYPE II DIABETES MELLITUS WITH PERIPHERAL AUTONOMIC NEUROPATHY: Primary | Chronic | ICD-10-CM

## 2023-05-30 DIAGNOSIS — Z12.31 ENCOUNTER FOR SCREENING MAMMOGRAM FOR BREAST CANCER: ICD-10-CM

## 2023-05-30 DIAGNOSIS — R17 SERUM TOTAL BILIRUBIN ELEVATED: ICD-10-CM

## 2023-05-30 DIAGNOSIS — R74.01 TRANSAMINITIS: ICD-10-CM

## 2023-05-30 DIAGNOSIS — R07.89 ATYPICAL CHEST PAIN: ICD-10-CM

## 2023-05-30 DIAGNOSIS — E78.2 MIXED HYPERLIPIDEMIA: ICD-10-CM

## 2023-05-30 DIAGNOSIS — E11.65 TYPE 2 DIABETES MELLITUS WITH HYPERGLYCEMIA, WITHOUT LONG-TERM CURRENT USE OF INSULIN: Chronic | ICD-10-CM

## 2023-05-30 DIAGNOSIS — M54.12 CERVICAL RADICULOPATHY: Chronic | ICD-10-CM

## 2023-05-30 LAB
ALBUMIN SERPL-MCNC: 4.3 G/DL (ref 3.5–5)
ALBUMIN/GLOB SERPL: 1.1 RATIO (ref 1.1–2)
ALP SERPL-CCNC: 131 UNIT/L (ref 40–150)
ALT SERPL-CCNC: 28 UNIT/L (ref 0–55)
AST SERPL-CCNC: 23 UNIT/L (ref 5–34)
BILIRUBIN DIRECT+TOT PNL SERPL-MCNC: 0.4 MG/DL (ref 0–?)
BILIRUBIN DIRECT+TOT PNL SERPL-MCNC: 1.7 MG/DL
BUN SERPL-MCNC: 35.4 MG/DL (ref 9.8–20.1)
CALCIUM SERPL-MCNC: 10.1 MG/DL (ref 8.4–10.2)
CHLORIDE SERPL-SCNC: 102 MMOL/L (ref 98–107)
CO2 SERPL-SCNC: 25 MMOL/L (ref 22–29)
CREAT SERPL-MCNC: 2.42 MG/DL (ref 0.55–1.02)
EST. AVERAGE GLUCOSE BLD GHB EST-MCNC: 159.9 MG/DL
GFR SERPLBLD CREATININE-BSD FMLA CKD-EPI: 23 MLS/MIN/1.73/M2
GLOBULIN SER-MCNC: 4 GM/DL (ref 2.4–3.5)
GLUCOSE SERPL-MCNC: 169 MG/DL (ref 74–100)
HBA1C MFR BLD: 7.2 %
POTASSIUM SERPL-SCNC: 4.7 MMOL/L (ref 3.5–5.1)
PROT SERPL-MCNC: 8.3 GM/DL (ref 6.4–8.3)
SODIUM SERPL-SCNC: 140 MMOL/L (ref 136–145)

## 2023-05-30 PROCEDURE — 3074F PR MOST RECENT SYSTOLIC BLOOD PRESSURE < 130 MM HG: ICD-10-PCS | Mod: CPTII,,, | Performed by: STUDENT IN AN ORGANIZED HEALTH CARE EDUCATION/TRAINING PROGRAM

## 2023-05-30 PROCEDURE — 4010F PR ACE/ARB THEARPY RXD/TAKEN: ICD-10-PCS | Mod: CPTII,,, | Performed by: STUDENT IN AN ORGANIZED HEALTH CARE EDUCATION/TRAINING PROGRAM

## 2023-05-30 PROCEDURE — 3066F PR DOCUMENTATION OF TREATMENT FOR NEPHROPATHY: ICD-10-PCS | Mod: CPTII,,, | Performed by: STUDENT IN AN ORGANIZED HEALTH CARE EDUCATION/TRAINING PROGRAM

## 2023-05-30 PROCEDURE — 3074F SYST BP LT 130 MM HG: CPT | Mod: CPTII,,, | Performed by: STUDENT IN AN ORGANIZED HEALTH CARE EDUCATION/TRAINING PROGRAM

## 2023-05-30 PROCEDURE — 3060F POS MICROALBUMINURIA REV: CPT | Mod: CPTII,,, | Performed by: STUDENT IN AN ORGANIZED HEALTH CARE EDUCATION/TRAINING PROGRAM

## 2023-05-30 PROCEDURE — 83036 HEMOGLOBIN GLYCOSYLATED A1C: CPT

## 2023-05-30 PROCEDURE — 1160F RVW MEDS BY RX/DR IN RCRD: CPT | Mod: CPTII,,, | Performed by: STUDENT IN AN ORGANIZED HEALTH CARE EDUCATION/TRAINING PROGRAM

## 2023-05-30 PROCEDURE — 1159F PR MEDICATION LIST DOCUMENTED IN MEDICAL RECORD: ICD-10-PCS | Mod: CPTII,,, | Performed by: STUDENT IN AN ORGANIZED HEALTH CARE EDUCATION/TRAINING PROGRAM

## 2023-05-30 PROCEDURE — 3060F PR POS MICROALBUMINURIA RESULT DOCUMENTED/REVIEW: ICD-10-PCS | Mod: CPTII,,, | Performed by: STUDENT IN AN ORGANIZED HEALTH CARE EDUCATION/TRAINING PROGRAM

## 2023-05-30 PROCEDURE — 3008F BODY MASS INDEX DOCD: CPT | Mod: CPTII,,, | Performed by: STUDENT IN AN ORGANIZED HEALTH CARE EDUCATION/TRAINING PROGRAM

## 2023-05-30 PROCEDURE — 4010F ACE/ARB THERAPY RXD/TAKEN: CPT | Mod: CPTII,,, | Performed by: STUDENT IN AN ORGANIZED HEALTH CARE EDUCATION/TRAINING PROGRAM

## 2023-05-30 PROCEDURE — 99214 OFFICE O/P EST MOD 30 MIN: CPT | Mod: ,,, | Performed by: STUDENT IN AN ORGANIZED HEALTH CARE EDUCATION/TRAINING PROGRAM

## 2023-05-30 PROCEDURE — 3008F PR BODY MASS INDEX (BMI) DOCUMENTED: ICD-10-PCS | Mod: CPTII,,, | Performed by: STUDENT IN AN ORGANIZED HEALTH CARE EDUCATION/TRAINING PROGRAM

## 2023-05-30 PROCEDURE — 82248 BILIRUBIN DIRECT: CPT

## 2023-05-30 PROCEDURE — 1159F MED LIST DOCD IN RCRD: CPT | Mod: CPTII,,, | Performed by: STUDENT IN AN ORGANIZED HEALTH CARE EDUCATION/TRAINING PROGRAM

## 2023-05-30 PROCEDURE — 36415 COLL VENOUS BLD VENIPUNCTURE: CPT

## 2023-05-30 PROCEDURE — 86803 HEPATITIS C AB TEST: CPT

## 2023-05-30 PROCEDURE — 80053 COMPREHEN METABOLIC PANEL: CPT

## 2023-05-30 PROCEDURE — 99214 PR OFFICE/OUTPT VISIT, EST, LEVL IV, 30-39 MIN: ICD-10-PCS | Mod: ,,, | Performed by: STUDENT IN AN ORGANIZED HEALTH CARE EDUCATION/TRAINING PROGRAM

## 2023-05-30 PROCEDURE — 1160F PR REVIEW ALL MEDS BY PRESCRIBER/CLIN PHARMACIST DOCUMENTED: ICD-10-PCS | Mod: CPTII,,, | Performed by: STUDENT IN AN ORGANIZED HEALTH CARE EDUCATION/TRAINING PROGRAM

## 2023-05-30 PROCEDURE — 3066F NEPHROPATHY DOC TX: CPT | Mod: CPTII,,, | Performed by: STUDENT IN AN ORGANIZED HEALTH CARE EDUCATION/TRAINING PROGRAM

## 2023-05-30 PROCEDURE — 3078F PR MOST RECENT DIASTOLIC BLOOD PRESSURE < 80 MM HG: ICD-10-PCS | Mod: CPTII,,, | Performed by: STUDENT IN AN ORGANIZED HEALTH CARE EDUCATION/TRAINING PROGRAM

## 2023-05-30 PROCEDURE — 3078F DIAST BP <80 MM HG: CPT | Mod: CPTII,,, | Performed by: STUDENT IN AN ORGANIZED HEALTH CARE EDUCATION/TRAINING PROGRAM

## 2023-05-30 RX ORDER — MELOXICAM 15 MG/1
15 TABLET ORAL DAILY
Qty: 30 TABLET | Refills: 2 | Status: SHIPPED | OUTPATIENT
Start: 2023-05-30 | End: 2023-06-20

## 2023-05-30 RX ORDER — PREDNISONE 20 MG/1
40 TABLET ORAL DAILY
Qty: 10 TABLET | Refills: 0 | Status: SHIPPED | OUTPATIENT
Start: 2023-05-30 | End: 2023-06-04

## 2023-05-30 RX ORDER — GABAPENTIN 600 MG/1
900 TABLET ORAL 2 TIMES DAILY
Qty: 90 TABLET | Refills: 11 | Status: SHIPPED | OUTPATIENT
Start: 2023-05-30 | End: 2023-09-05

## 2023-05-30 RX ORDER — TIZANIDINE HYDROCHLORIDE 4 MG/1
4 CAPSULE, GELATIN COATED ORAL EVERY 8 HOURS PRN
COMMUNITY
End: 2024-02-16

## 2023-05-30 NOTE — PROGRESS NOTES
Subjective:       Patient ID: Kirstie hCristianson is a 52 y.o. female.    Past Medical History:   CHF (NYHA class III, ACC/AHA Stage C)  Diabetes Mellitus II  Disorder of kidney and ureter  Hilar lymphadenopathy  Obesity, unspecified  Pulmonary nodule  Systolic heart failure     Chief Complaint: Obesity    Presents to clinic for follow up. Didn't get labs prior, will go afterwards. Due for mammogram. No recent changes to her medications. Endorsed worsening neck, back and shoulder pain with radiculopathy symptoms. Muscle relaxer helps a little. Unable to do MRI for financial reasons, postponed referral to [facility] until the MRI can be done. Wants to know if she can take anything else to help provide relief in the meantime. Feels like the gabapentin is helping. Further investigation revealed episodic chest pain. Need refill on Trulicity, however would like to discuss another glycemic agent that would help with weight loss and better control her sugars.     Review of Systems   Constitutional:  Positive for fatigue. Negative for chills and fever.   Respiratory:  Negative for cough and shortness of breath.    Cardiovascular:  Positive for chest pain.   Gastrointestinal:  Negative for abdominal pain and vomiting.   Genitourinary:  Negative for dysuria and hematuria.   Musculoskeletal:  Positive for arthralgias and back pain.   Neurological:  Negative for syncope.       Objective:      Physical Exam  Vitals and nursing note reviewed.   Constitutional:       General: She is not in acute distress.     Appearance: Normal appearance. She is not ill-appearing.   Eyes:      General: No scleral icterus.     Extraocular Movements: Extraocular movements intact.      Conjunctiva/sclera: Conjunctivae normal.      Pupils: Pupils are equal, round, and reactive to light.   Cardiovascular:      Rate and Rhythm: Normal rate and regular rhythm.      Pulses: Normal pulses.      Heart sounds: Normal heart sounds. No murmur heard.  Pulmonary:       Effort: Pulmonary effort is normal. No respiratory distress.      Breath sounds: Normal breath sounds. No wheezing.   Abdominal:      General: There is no distension.      Palpations: Abdomen is soft.      Tenderness: There is no abdominal tenderness.   Musculoskeletal:      Right lower leg: No edema.      Left lower leg: No edema.   Skin:     General: Skin is warm.      Coloration: Skin is not jaundiced.   Neurological:      Mental Status: She is alert. Mental status is at baseline.      Gait: Gait normal.   Psychiatric:         Mood and Affect: Mood normal.         Behavior: Behavior normal.       Assessment & Plan:   1. Type II diabetes mellitus with peripheral autonomic neuropathy  Assessment & Plan:  Ordered A1c - awaiting results, goal A1c <7.0%   Continue Synjardy XR  Switch to Mounjaro for better glycemic and weight loss   Continue ACE-I and Statin   Continue ADA diet, Counseled on importance of diet & weight loss   Orders:  -     tirzepatide 2.5 mg/0.5 mL PnIj; Inject 2.5 mg into the skin every 7 days. for 14 days  Dispense: 2 pen; Refill: 0  -     tirzepatide 5 mg/0.5 mL PnIj; Inject 5 mg into the skin every 7 days. for 14 days  Dispense: 2 pen; Refill: 0  -     tirzepatide 7.5 mg/0.5 mL PnIj; Inject 7.5 mg into the skin every 7 days.  Dispense: 4 pen; Refill: 2    2. Encounter for screening mammogram for breast cancer  -     Mammo Digital Screening Bilat w/ Dre; Future; Expected date: 05/30/2023    3. Cervical radiculopathy  Assessment & Plan:  Worsening  Physical therapy nor conservative pain management has worked.   XR showed degenerative disease. MRI and referall to [facility] was placed at last visit, unable to do at this time due to financial reasons.   Increase 900mg BID of Gabapentin, start mobic as needed and prednisone 5 days scheduled course.   Orders:  -     meloxicam (MOBIC) 15 MG tablet; Take 1 tablet (15 mg total) by mouth once daily.  Dispense: 30 tablet; Refill: 2  -     predniSONE (DELTASONE)  20 MG tablet; Take 2 tablets (40 mg total) by mouth once daily. for 5 days  Dispense: 10 tablet; Refill: 0    4. Lumbar back pain with radiculopathy affecting lower extremity  Assessment & Plan:  Worsening  Increase gabapentin to 900 mg b.i.d.  X-ray of the lumbar spine showed degenerative changes, failed conservative therapy and physical therapy completed multiple weeks without significant improvement   Ordered MRI and referred to Neurosurgery for further possible intervention - unable to do due to financial reasons at this time   Start mobic as needed and prednisone 5 day course  Continue to tylenol as needed, Voltaren gel as needed  Orders:  -     gabapentin (NEURONTIN) 600 MG tablet; Take 1.5 tablets (900 mg total) by mouth 2 (two) times daily.  Dispense: 90 tablet; Refill: 11    5. Atypical chest pain  Comments:  Suspect musculoskeletal, improve with muscle relaxer   Recommended  reaching to cardiology for further evaluation given history   ED precuations     Follow up in about 3 months (around 8/30/2023) for Diabetes. In addition to their scheduled follow up, the patient has also been instructed to follow up on as needed basis.

## 2023-05-31 ENCOUNTER — TELEPHONE (OUTPATIENT)
Dept: PRIMARY CARE CLINIC | Facility: CLINIC | Age: 53
End: 2023-05-31
Payer: COMMERCIAL

## 2023-05-31 DIAGNOSIS — N17.9 AKI (ACUTE KIDNEY INJURY): Primary | ICD-10-CM

## 2023-05-31 LAB — HCV AB SERPL QL IA: NONREACTIVE

## 2023-05-31 NOTE — PROGRESS NOTES
Please let the patient know the following if you are able to contact her/she calls the office back:     A1c elevated at 7.2. Start Mounjaro. Not too concerned about this elevation, goal is <7.0. We will get back on track!    Kidney function has severely declined. BUN is 35.4 and Creatinine is 2.42. Four months ago it was normal range at BUN 17.8 and Creatinine 0.98. Pt. Needs to STOP these medications immediately: Synjardy XR, Lasix, Benicar, Mobic & Prednisone (medication I prescribed yesterday prior to these results).     Monitor your blood pressure, let me know if it gets high.     NO Over the counter NSAIDs (ie Naproxen, Aleve, Ibuprofen, Advil etc). ONLY Tylenol for pain/headaches.   NO diuretics such as coffee, tea, alcohol.     Need to drink at least 80 ounces of water daily!    Will repeat labs on Thursday afternoon/Friday morning to check for improvement. As well as Urinalysis. Orders are placed.     Does the patient have any urinary tract infection symptoms? History of kidney stones?     If patient has SOB, Bilateral lower extremity swelling, chest pain, decrease in urine output etc. Need to report to the ED for further evaluation immediately.

## 2023-05-31 NOTE — TELEPHONE ENCOUNTER
----- Message from Marie Brambila MD sent at 5/31/2023  8:18 AM CDT -----  Please let the patient know the following if you are able to contact her/she calls the office back:     A1c elevated at 7.2. Start Mounjaro. Not too concerned about this elevation, goal is <7.0. We will get back on track!    Kidney function has severely declined. BUN is 35.4 and Creatinine is 2.42. Four months ago it was normal range at BUN 17.8 and Creatinine 0.98. Pt. Needs to STOP these medications immediately: Synjardy XR, Lasix, Benicar, Mobic & Prednisone (medication I prescribed yesterday prior to these results).     Monitor your blood pressure, let me know if it gets high.     NO Over the counter NSAIDs (ie Naproxen, Aleve, Ibuprofen, Advil etc). ONLY Tylenol for pain/headaches.   NO diuretics such as coffee, tea, alcohol.     Need to drink at least 80 ounces of water daily!    Will repeat labs on Thursday afternoon/Friday morning to check for improvement. As well as Urinalysis. Orders are placed.     Does the patient have any urinary tract infection symptoms? History of kidney stones?     If patient has SOB, Bilateral lower extremity swelling, chest pain, decrease in urine output etc. Need to report to the ED for further evaluation immediately.

## 2023-05-31 NOTE — ASSESSMENT & PLAN NOTE
Ordered A1c - awaiting results, goal A1c <7.0%   Continue Synjardy XR  Switch to Mounjaro for better glycemic and weight loss   Continue ACE-I and Statin   Continue ADA diet, Counseled on importance of diet & weight loss

## 2023-05-31 NOTE — ASSESSMENT & PLAN NOTE
Worsening  Increase gabapentin to 900 mg b.i.d.  X-ray of the lumbar spine showed degenerative changes, failed conservative therapy and physical therapy completed multiple weeks without significant improvement   Ordered MRI and referred to Neurosurgery for further possible intervention - unable to do due to financial reasons at this time   Start mobic daily and prednisone 5 day course  Continue to tylenol as needed, Voltaren gel as needed

## 2023-05-31 NOTE — ASSESSMENT & PLAN NOTE
Worsening  Physical therapy nor conservative pain management has worked.   XR showed degenerative disease. MRI and referall to [facility] was placed at last visit, unable to do at this time due to financial reasons.   Increase 900mg BID of Gabapentin, start mobic as needed and prednisone 5 days scheduled course.

## 2023-05-31 NOTE — TELEPHONE ENCOUNTER
Wireless not available.--patient line  Mr mejia- left message  Emergency- lvm   Dr bourne is need to talk to patient directly.

## 2023-06-05 ENCOUNTER — LAB VISIT (OUTPATIENT)
Dept: LAB | Facility: HOSPITAL | Age: 53
End: 2023-06-05
Attending: STUDENT IN AN ORGANIZED HEALTH CARE EDUCATION/TRAINING PROGRAM
Payer: COMMERCIAL

## 2023-06-05 DIAGNOSIS — N17.9 AKI (ACUTE KIDNEY INJURY): ICD-10-CM

## 2023-06-05 LAB
ALBUMIN SERPL-MCNC: 4 G/DL (ref 3.5–5)
ALBUMIN/GLOB SERPL: 1.1 RATIO (ref 1.1–2)
ALP SERPL-CCNC: 108 UNIT/L (ref 40–150)
ALT SERPL-CCNC: 35 UNIT/L (ref 0–55)
APPEARANCE UR: ABNORMAL
AST SERPL-CCNC: 29 UNIT/L (ref 5–34)
BACTERIA #/AREA URNS AUTO: ABNORMAL /HPF
BILIRUB UR QL STRIP.AUTO: ABNORMAL MG/DL
BILIRUBIN DIRECT+TOT PNL SERPL-MCNC: 1.3 MG/DL
BUN SERPL-MCNC: 18.6 MG/DL (ref 9.8–20.1)
CALCIUM SERPL-MCNC: 9.7 MG/DL (ref 8.4–10.2)
CHLORIDE SERPL-SCNC: 107 MMOL/L (ref 98–107)
CO2 SERPL-SCNC: 28 MMOL/L (ref 22–29)
COLOR UR: ABNORMAL
CREAT SERPL-MCNC: 1.08 MG/DL (ref 0.55–1.02)
GFR SERPLBLD CREATININE-BSD FMLA CKD-EPI: >60 MLS/MIN/1.73/M2
GLOBULIN SER-MCNC: 3.8 GM/DL (ref 2.4–3.5)
GLUCOSE SERPL-MCNC: 118 MG/DL (ref 74–100)
GLUCOSE UR QL STRIP.AUTO: ABNORMAL MG/DL
KETONES UR QL STRIP.AUTO: NEGATIVE MG/DL
LEUKOCYTE ESTERASE UR QL STRIP.AUTO: ABNORMAL UNIT/L
NITRITE UR QL STRIP.AUTO: NEGATIVE
PH UR STRIP.AUTO: 5.5 [PH]
POTASSIUM SERPL-SCNC: 4.3 MMOL/L (ref 3.5–5.1)
PROT SERPL-MCNC: 7.8 GM/DL (ref 6.4–8.3)
PROT UR QL STRIP.AUTO: ABNORMAL MG/DL
RBC #/AREA URNS AUTO: <5 /HPF
RBC UR QL AUTO: NEGATIVE UNIT/L
SODIUM SERPL-SCNC: 140 MMOL/L (ref 136–145)
SP GR UR STRIP.AUTO: 1.03 (ref 1–1.03)
SQUAMOUS #/AREA URNS AUTO: >36 /HPF
UROBILINOGEN UR STRIP-ACNC: 1 MG/DL
WBC #/AREA URNS AUTO: 52 /HPF

## 2023-06-05 PROCEDURE — 36415 COLL VENOUS BLD VENIPUNCTURE: CPT

## 2023-06-05 PROCEDURE — 81001 URINALYSIS AUTO W/SCOPE: CPT

## 2023-06-05 PROCEDURE — 80053 COMPREHEN METABOLIC PANEL: CPT

## 2023-06-05 PROCEDURE — 87088 URINE BACTERIA CULTURE: CPT

## 2023-06-06 DIAGNOSIS — N17.9 AKI (ACUTE KIDNEY INJURY): Primary | ICD-10-CM

## 2023-06-06 NOTE — PROGRESS NOTES
Please let the patient know that her repeat labs showed improvement in her kidney function. Almost back to normal. BUN went  from 35.4 to 18.6, and Creatinine went from 2.42 to 1.08.     We will start to slowly add back the medications. Pt. Can restart her Lasix and Benicar ONLY. Still hold the others.     Continue the following:   NO Over the counter NSAIDs (ie Naproxen, Aleve, Ibuprofen, Advil etc). ONLY Tylenol for pain/headaches.   NO diuretics such as coffee, tea, alcohol.     Will repeat labs in 2 weeks to ensure resolution before adding back any further medications.     Pt. Needs to monitor her hydration status closely while on all of these medications.     Marie Brambila MD

## 2023-06-07 ENCOUNTER — PATIENT OUTREACH (OUTPATIENT)
Dept: ADMINISTRATIVE | Facility: HOSPITAL | Age: 53
End: 2023-06-07
Payer: COMMERCIAL

## 2023-06-07 LAB — BACTERIA UR CULT: NORMAL

## 2023-06-20 ENCOUNTER — LAB VISIT (OUTPATIENT)
Dept: LAB | Facility: HOSPITAL | Age: 53
End: 2023-06-20
Attending: STUDENT IN AN ORGANIZED HEALTH CARE EDUCATION/TRAINING PROGRAM
Payer: COMMERCIAL

## 2023-06-20 DIAGNOSIS — I10 PRIMARY HYPERTENSION: ICD-10-CM

## 2023-06-20 DIAGNOSIS — I50.20 HEART FAILURE WITH REDUCED EJECTION FRACTION: ICD-10-CM

## 2023-06-20 DIAGNOSIS — N17.9 AKI (ACUTE KIDNEY INJURY): ICD-10-CM

## 2023-06-20 DIAGNOSIS — K21.9 GASTROESOPHAGEAL REFLUX DISEASE, UNSPECIFIED WHETHER ESOPHAGITIS PRESENT: ICD-10-CM

## 2023-06-20 LAB
ALBUMIN SERPL-MCNC: 3.8 G/DL (ref 3.5–5)
ALBUMIN/GLOB SERPL: 1.2 RATIO (ref 1.1–2)
ALP SERPL-CCNC: 101 UNIT/L (ref 40–150)
ALT SERPL-CCNC: 31 UNIT/L (ref 0–55)
AST SERPL-CCNC: 23 UNIT/L (ref 5–34)
BILIRUBIN DIRECT+TOT PNL SERPL-MCNC: 1.9 MG/DL
BUN SERPL-MCNC: 20.6 MG/DL (ref 9.8–20.1)
CALCIUM SERPL-MCNC: 8.8 MG/DL (ref 8.4–10.2)
CHLORIDE SERPL-SCNC: 108 MMOL/L (ref 98–107)
CO2 SERPL-SCNC: 26 MMOL/L (ref 22–29)
CREAT SERPL-MCNC: 1.13 MG/DL (ref 0.55–1.02)
GFR SERPLBLD CREATININE-BSD FMLA CKD-EPI: 58 MLS/MIN/1.73/M2
GLOBULIN SER-MCNC: 3.3 GM/DL (ref 2.4–3.5)
GLUCOSE SERPL-MCNC: 138 MG/DL (ref 74–100)
POTASSIUM SERPL-SCNC: 4.5 MMOL/L (ref 3.5–5.1)
PROT SERPL-MCNC: 7.1 GM/DL (ref 6.4–8.3)
SODIUM SERPL-SCNC: 140 MMOL/L (ref 136–145)

## 2023-06-20 PROCEDURE — 36415 COLL VENOUS BLD VENIPUNCTURE: CPT

## 2023-06-20 PROCEDURE — 80053 COMPREHEN METABOLIC PANEL: CPT

## 2023-06-20 RX ORDER — CARVEDILOL 25 MG/1
25 TABLET ORAL 2 TIMES DAILY WITH MEALS
Qty: 60 TABLET | Refills: 2 | Status: SHIPPED | OUTPATIENT
Start: 2023-06-20

## 2023-06-20 RX ORDER — PREDNISONE 20 MG/1
40 TABLET ORAL DAILY
Qty: 10 TABLET | Refills: 0 | Status: SHIPPED | OUTPATIENT
Start: 2023-06-20 | End: 2023-06-25

## 2023-06-20 RX ORDER — PANTOPRAZOLE SODIUM 20 MG/1
20 TABLET, DELAYED RELEASE ORAL DAILY
Qty: 90 TABLET | Refills: 3 | Status: CANCELLED | OUTPATIENT
Start: 2023-06-20

## 2023-06-20 RX ORDER — CARVEDILOL 25 MG/1
25 TABLET ORAL 2 TIMES DAILY WITH MEALS
Qty: 60 TABLET | Refills: 2 | Status: CANCELLED | OUTPATIENT
Start: 2023-06-20

## 2023-06-20 RX ORDER — PANTOPRAZOLE SODIUM 20 MG/1
20 TABLET, DELAYED RELEASE ORAL DAILY
Qty: 90 TABLET | Refills: 3 | Status: SHIPPED | OUTPATIENT
Start: 2023-06-20

## 2023-06-20 NOTE — TELEPHONE ENCOUNTER
Patient walked in to show back area swollen.   Issue she reported weeks ago.   Advised to get mri dr order for further studies.  Patient reported due to finances  she could not get done.   Patient reports she will get done on next check.   Just completed repeat labs today next door.

## 2023-06-20 NOTE — TELEPHONE ENCOUNTER
Recommend tylenol 1,000 mg 3x a day.   OTC Topical patches (ie Salon Pas).   OK to start the 5 day course of prednisone now.     NO NSAIDs (ie Ibuprofen, Naproxen, Mobic).   NO Alcohol     Unfortunately limited due to kidney injury and conservative therapy failing in the past. Strongly recommend MRI so patient can get set up with [facility] and specialist who can injection pain relief to her spine. But cannot move forward without the MRI.     Marie Brambila MD

## 2023-06-20 NOTE — PROGRESS NOTES
Repeat labs show mild dehydration with high chloride level and BUN. Make sure you are drinking lots of fluids (at least 60 to 80 ounces of water).     Creatinine is stable. OK to continue Lasix and Benicar. Monitor Blood Pressure.     NO diuretics such as coffee, tea, alcohol.  NO Over the counter NSAIDs (ie Naproxen, Aleve, Ibuprofen, Advil etc). ONLY Tylenol.     Will hold off on Synjardy until next visit. Keep doing Mounjaro for Glucose Control.     Marie Brambila MD

## 2023-06-28 ENCOUNTER — TELEPHONE (OUTPATIENT)
Dept: PRIMARY CARE CLINIC | Facility: CLINIC | Age: 53
End: 2023-06-28
Payer: COMMERCIAL

## 2023-06-28 DIAGNOSIS — E11.43 TYPE II DIABETES MELLITUS WITH PERIPHERAL AUTONOMIC NEUROPATHY: Chronic | ICD-10-CM

## 2023-06-28 NOTE — TELEPHONE ENCOUNTER
----- Message from Saba Newell sent at 6/28/2023  1:02 PM CDT -----  Regarding: return call  .Type:  Patient Returning Call    Who Called:pt  Who Left Message for Patient:no  Does the patient know what this is regarding?:no  Would the patient rather a call back or a response via [facility]?   Best Call Back Number:[phone]  Additional Information:

## 2023-06-28 NOTE — TELEPHONE ENCOUNTER
----- Message from Orly Watters sent at 6/28/2023  9:58 AM CDT -----  Regarding: Patent Call  .Type:  Patient Returning Call    Who Called:pt  Who Left Message for Patient office staff  Does the patient know what this is regarding?: medication decrease  Would the patient rather a call back or a response via [facility]?   Best Call Back Number:[phone]  Additional Information: pt states the tirzepatide 7.5 mg/0.5 mL PnIj, is too expensive and would like to move back to tirzepatide 5 mg/0.5 mL PnIj which would only cost $25 instead of $300, please advise

## 2023-07-10 ENCOUNTER — DOCUMENTATION ONLY (OUTPATIENT)
Dept: ADMINISTRATIVE | Facility: HOSPITAL | Age: 53
End: 2023-07-10
Payer: COMMERCIAL

## 2023-07-11 ENCOUNTER — TELEPHONE (OUTPATIENT)
Dept: PRIMARY CARE CLINIC | Facility: CLINIC | Age: 53
End: 2023-07-11
Payer: COMMERCIAL

## 2023-07-11 DIAGNOSIS — E11.43 TYPE II DIABETES MELLITUS WITH PERIPHERAL AUTONOMIC NEUROPATHY: Primary | ICD-10-CM

## 2023-07-11 NOTE — TELEPHONE ENCOUNTER
----- Message from Marie Brambila MD sent at 7/11/2023 10:29 AM CDT -----  Regarding: RE: Medication & Advice    ----- Message -----  From:  Kenneth  Sent: 7/10/2023  11:07 AM CDT  To: Marie Brambila MD  Subject: Medication & Advice                              Pt came in about redoing lab work because no orders was put in .Notified her that we would repeat for her September appointment.   She said she can't afford the mounjaro because it is to expensive. She wanted to know what else could she take or if she could go back to Trulicity ?    She also asked about your advice--------NO diuretics such as coffee, tea, alcohol.    She said she know it's because of her kidney but she said she has to live her life. She doesn't drink alcohol a lot but she does have a drink here and there.   I did tell her to increase her water intake. I asked her what she drinks most she said milk besides water. What else could she drink that would be beneficial ?     CB: [phone]

## 2023-07-11 NOTE — TELEPHONE ENCOUNTER
Spoke with the patient to schedule her diabetic eye exam. The patient is scheduled for 7/14 at 10:20 am.

## 2023-07-14 ENCOUNTER — CLINICAL SUPPORT (OUTPATIENT)
Dept: PRIMARY CARE CLINIC | Facility: CLINIC | Age: 53
End: 2023-07-14
Attending: STUDENT IN AN ORGANIZED HEALTH CARE EDUCATION/TRAINING PROGRAM
Payer: COMMERCIAL

## 2023-07-14 DIAGNOSIS — E11.43 TYPE II DIABETES MELLITUS WITH PERIPHERAL AUTONOMIC NEUROPATHY: ICD-10-CM

## 2023-07-14 NOTE — PROGRESS NOTES
Kirstie Christianson is a 52 y.o. female here for a diabetic eye screening with non-dilated fundus photos per Dr. Marie Brambila.    Patient cooperative?: Yes  Small pupils?: No  Last eye exam: 1 year    For exam results, see Encounter Report.

## 2023-07-14 NOTE — Clinical Note
Please notify patient of results from eye camera photo: no diabetic retinopathy seen. Recommend f/u in 1 year

## 2023-07-19 LAB
LEFT EYE DM RETINOPATHY: NEGATIVE
RIGHT EYE DM RETINOPATHY: NEGATIVE

## 2023-07-20 ENCOUNTER — TELEPHONE (OUTPATIENT)
Dept: PRIMARY CARE CLINIC | Facility: CLINIC | Age: 53
End: 2023-07-20
Payer: COMMERCIAL

## 2023-07-20 NOTE — TELEPHONE ENCOUNTER
----- Message from Ashwini Hooker RN sent at 7/19/2023 11:59 AM CDT -----  Please notify patient of results from eye camera photo: no diabetic retinopathy seen. Recommend f/u in 1 year

## 2023-08-17 ENCOUNTER — HOSPITAL ENCOUNTER (OUTPATIENT)
Dept: RADIOLOGY | Facility: HOSPITAL | Age: 53
Discharge: HOME | End: 2023-08-17
Attending: INTERNAL MEDICINE
Payer: COMMERCIAL

## 2023-08-17 DIAGNOSIS — R59.0 MEDIASTINAL LYMPHADENOPATHY: ICD-10-CM

## 2023-08-17 DIAGNOSIS — R59.0 HILAR LYMPHADENOPATHY: ICD-10-CM

## 2023-08-17 DIAGNOSIS — R91.8 PULMONARY NODULES: ICD-10-CM

## 2023-08-17 PROCEDURE — 71250 CT THORAX DX C-: CPT | Mod: TC

## 2023-08-29 DIAGNOSIS — I50.9 ACC/AHA STAGE C CONGESTIVE HEART FAILURE: ICD-10-CM

## 2023-08-29 DIAGNOSIS — E11.59 TYPE 2 DIABETES MELLITUS WITH OTHER CIRCULATORY COMPLICATION, WITHOUT LONG-TERM CURRENT USE OF INSULIN: Chronic | ICD-10-CM

## 2023-08-29 DIAGNOSIS — I10 PRIMARY HYPERTENSION: Primary | Chronic | ICD-10-CM

## 2023-09-01 ENCOUNTER — LAB VISIT (OUTPATIENT)
Dept: LAB | Facility: HOSPITAL | Age: 53
End: 2023-09-01
Attending: STUDENT IN AN ORGANIZED HEALTH CARE EDUCATION/TRAINING PROGRAM
Payer: COMMERCIAL

## 2023-09-01 DIAGNOSIS — E11.59 TYPE 2 DIABETES MELLITUS WITH OTHER CIRCULATORY COMPLICATION, WITHOUT LONG-TERM CURRENT USE OF INSULIN: Chronic | ICD-10-CM

## 2023-09-01 DIAGNOSIS — I50.9 ACC/AHA STAGE C CONGESTIVE HEART FAILURE: ICD-10-CM

## 2023-09-01 DIAGNOSIS — I10 PRIMARY HYPERTENSION: Chronic | ICD-10-CM

## 2023-09-01 LAB
ANION GAP SERPL CALC-SCNC: 8 MEQ/L
BUN SERPL-MCNC: 14.7 MG/DL (ref 9.8–20.1)
CALCIUM SERPL-MCNC: 9.8 MG/DL (ref 8.4–10.2)
CHLORIDE SERPL-SCNC: 106 MMOL/L (ref 98–107)
CO2 SERPL-SCNC: 25 MMOL/L (ref 22–29)
CREAT SERPL-MCNC: 0.91 MG/DL (ref 0.55–1.02)
CREAT/UREA NIT SERPL: 16
EST. AVERAGE GLUCOSE BLD GHB EST-MCNC: 188.6 MG/DL
GFR SERPLBLD CREATININE-BSD FMLA CKD-EPI: >60 MLS/MIN/1.73/M2
GLUCOSE SERPL-MCNC: 119 MG/DL (ref 74–100)
HBA1C MFR BLD: 8.2 %
POTASSIUM SERPL-SCNC: 3.8 MMOL/L (ref 3.5–5.1)
SODIUM SERPL-SCNC: 139 MMOL/L (ref 136–145)

## 2023-09-01 PROCEDURE — 80048 BASIC METABOLIC PNL TOTAL CA: CPT

## 2023-09-01 PROCEDURE — 36415 COLL VENOUS BLD VENIPUNCTURE: CPT

## 2023-09-01 PROCEDURE — 83036 HEMOGLOBIN GLYCOSYLATED A1C: CPT

## 2023-09-05 ENCOUNTER — OFFICE VISIT (OUTPATIENT)
Dept: PRIMARY CARE CLINIC | Facility: CLINIC | Age: 53
End: 2023-09-05
Payer: COMMERCIAL

## 2023-09-05 VITALS
WEIGHT: 204 LBS | DIASTOLIC BLOOD PRESSURE: 68 MMHG | BODY MASS INDEX: 37.54 KG/M2 | SYSTOLIC BLOOD PRESSURE: 102 MMHG | OXYGEN SATURATION: 99 % | RESPIRATION RATE: 18 BRPM | HEIGHT: 62 IN | HEART RATE: 74 BPM

## 2023-09-05 DIAGNOSIS — I50.20 HEART FAILURE WITH REDUCED EJECTION FRACTION: ICD-10-CM

## 2023-09-05 DIAGNOSIS — E11.43 TYPE II DIABETES MELLITUS WITH PERIPHERAL AUTONOMIC NEUROPATHY: Chronic | ICD-10-CM

## 2023-09-05 DIAGNOSIS — I10 PRIMARY HYPERTENSION: ICD-10-CM

## 2023-09-05 DIAGNOSIS — M54.16 LUMBAR BACK PAIN WITH RADICULOPATHY AFFECTING LOWER EXTREMITY: Chronic | ICD-10-CM

## 2023-09-05 DIAGNOSIS — R53.83 FATIGUE, UNSPECIFIED TYPE: ICD-10-CM

## 2023-09-05 DIAGNOSIS — I10 PRIMARY HYPERTENSION: Primary | ICD-10-CM

## 2023-09-05 PROCEDURE — 4010F ACE/ARB THERAPY RXD/TAKEN: CPT | Mod: CPTII,,, | Performed by: STUDENT IN AN ORGANIZED HEALTH CARE EDUCATION/TRAINING PROGRAM

## 2023-09-05 PROCEDURE — 3060F POS MICROALBUMINURIA REV: CPT | Mod: CPTII,,, | Performed by: STUDENT IN AN ORGANIZED HEALTH CARE EDUCATION/TRAINING PROGRAM

## 2023-09-05 PROCEDURE — 3074F PR MOST RECENT SYSTOLIC BLOOD PRESSURE < 130 MM HG: ICD-10-PCS | Mod: CPTII,,, | Performed by: STUDENT IN AN ORGANIZED HEALTH CARE EDUCATION/TRAINING PROGRAM

## 2023-09-05 PROCEDURE — 3074F SYST BP LT 130 MM HG: CPT | Mod: CPTII,,, | Performed by: STUDENT IN AN ORGANIZED HEALTH CARE EDUCATION/TRAINING PROGRAM

## 2023-09-05 PROCEDURE — 3052F HG A1C>EQUAL 8.0%<EQUAL 9.0%: CPT | Mod: CPTII,,, | Performed by: STUDENT IN AN ORGANIZED HEALTH CARE EDUCATION/TRAINING PROGRAM

## 2023-09-05 PROCEDURE — 99214 PR OFFICE/OUTPT VISIT, EST, LEVL IV, 30-39 MIN: ICD-10-PCS | Mod: ,,, | Performed by: STUDENT IN AN ORGANIZED HEALTH CARE EDUCATION/TRAINING PROGRAM

## 2023-09-05 PROCEDURE — 3060F PR POS MICROALBUMINURIA RESULT DOCUMENTED/REVIEW: ICD-10-PCS | Mod: CPTII,,, | Performed by: STUDENT IN AN ORGANIZED HEALTH CARE EDUCATION/TRAINING PROGRAM

## 2023-09-05 PROCEDURE — 3008F PR BODY MASS INDEX (BMI) DOCUMENTED: ICD-10-PCS | Mod: CPTII,,, | Performed by: STUDENT IN AN ORGANIZED HEALTH CARE EDUCATION/TRAINING PROGRAM

## 2023-09-05 PROCEDURE — 3052F PR MOST RECENT HEMOGLOBIN A1C LEVEL 8.0 - < 9.0%: ICD-10-PCS | Mod: CPTII,,, | Performed by: STUDENT IN AN ORGANIZED HEALTH CARE EDUCATION/TRAINING PROGRAM

## 2023-09-05 PROCEDURE — 3066F NEPHROPATHY DOC TX: CPT | Mod: CPTII,,, | Performed by: STUDENT IN AN ORGANIZED HEALTH CARE EDUCATION/TRAINING PROGRAM

## 2023-09-05 PROCEDURE — 4010F PR ACE/ARB THEARPY RXD/TAKEN: ICD-10-PCS | Mod: CPTII,,, | Performed by: STUDENT IN AN ORGANIZED HEALTH CARE EDUCATION/TRAINING PROGRAM

## 2023-09-05 PROCEDURE — 3078F DIAST BP <80 MM HG: CPT | Mod: CPTII,,, | Performed by: STUDENT IN AN ORGANIZED HEALTH CARE EDUCATION/TRAINING PROGRAM

## 2023-09-05 PROCEDURE — 3078F PR MOST RECENT DIASTOLIC BLOOD PRESSURE < 80 MM HG: ICD-10-PCS | Mod: CPTII,,, | Performed by: STUDENT IN AN ORGANIZED HEALTH CARE EDUCATION/TRAINING PROGRAM

## 2023-09-05 PROCEDURE — 99214 OFFICE O/P EST MOD 30 MIN: CPT | Mod: ,,, | Performed by: STUDENT IN AN ORGANIZED HEALTH CARE EDUCATION/TRAINING PROGRAM

## 2023-09-05 PROCEDURE — 3008F BODY MASS INDEX DOCD: CPT | Mod: CPTII,,, | Performed by: STUDENT IN AN ORGANIZED HEALTH CARE EDUCATION/TRAINING PROGRAM

## 2023-09-05 PROCEDURE — 3066F PR DOCUMENTATION OF TREATMENT FOR NEPHROPATHY: ICD-10-PCS | Mod: CPTII,,, | Performed by: STUDENT IN AN ORGANIZED HEALTH CARE EDUCATION/TRAINING PROGRAM

## 2023-09-05 RX ORDER — OLMESARTAN MEDOXOMIL 20 MG/1
20 TABLET ORAL DAILY
Qty: 180 TABLET | Refills: 2 | Status: SHIPPED | OUTPATIENT
Start: 2023-09-05

## 2023-09-05 RX ORDER — FUROSEMIDE 20 MG/1
20 TABLET ORAL DAILY PRN
Qty: 30 TABLET | Refills: 2 | Status: SHIPPED | OUTPATIENT
Start: 2023-09-05

## 2023-09-05 RX ORDER — OLMESARTAN MEDOXOMIL 20 MG/1
40 TABLET ORAL DAILY
Qty: 180 TABLET | Refills: 2 | Status: CANCELLED | OUTPATIENT
Start: 2023-09-05

## 2023-09-05 RX ORDER — GABAPENTIN 600 MG/1
900 TABLET ORAL 2 TIMES DAILY
Qty: 90 TABLET | Refills: 11 | Status: SHIPPED | OUTPATIENT
Start: 2023-09-05 | End: 2023-10-19 | Stop reason: ALTCHOICE

## 2023-09-05 NOTE — PROGRESS NOTES
Subjective:       Patient ID: Kirstie Christianson is a 52 y.o. female.    Past Medical History:   CHF (NYHA class III, ACC/AHA Stage C)  Diabetes Mellitus II  Disorder of kidney and ureter  Hilar lymphadenopathy  Obesity, unspecified  Pulmonary nodule  Systolic heart failure     Chief Complaint: Fatigue, dry mouth, and Diabetes    Presents to the clinic for follow up. Endorsed fatigue, low BP readings. Also noted to have dry mouth more frequently. Reviewed labs, answered all questions and concerns at this time. Kidney function returned back to baseline. A1c increased to 8.2. Not taking anything.       Review of Systems   Constitutional:  Positive for fatigue. Negative for chills and fever.   HENT:  Positive for mouth dryness.    Respiratory:  Negative for cough.    Cardiovascular:  Negative for chest pain.   Gastrointestinal:  Negative for abdominal pain and vomiting.   Genitourinary:  Negative for dysuria and hematuria.         Objective:      Physical Exam  Vitals and nursing note reviewed.   Constitutional:       General: She is not in acute distress.     Appearance: Normal appearance. She is not ill-appearing.   HENT:      Head: Normocephalic.      Mouth/Throat:      Mouth: Mucous membranes are moist.   Eyes:      General: No scleral icterus.     Conjunctiva/sclera: Conjunctivae normal.   Cardiovascular:      Rate and Rhythm: Normal rate and regular rhythm.   Pulmonary:      Effort: Pulmonary effort is normal. No respiratory distress.   Musculoskeletal:      Right lower leg: No edema.      Left lower leg: No edema.   Skin:     General: Skin is warm.      Coloration: Skin is not jaundiced.   Neurological:      Mental Status: She is alert. Mental status is at baseline.      Gait: Gait normal.   Psychiatric:         Mood and Affect: Mood normal.         Behavior: Behavior normal.         Assessment & Plan:   1. Primary hypertension  Assessment & Plan:  Today's BP Hypotensive episodes, likely resulting in  fatigue  Continue Coreg, Norvasc   Decrease Benicar to 20mg daily, and Lasix as needed  Counseled on low sodium diet, exercise, tobacco avoidance, and limiting alcohol intake   Pt. Has home BP cuff, instructed to measure home BP and report abnormal values    2 week nurse visit for BP follow up      Orders:  -     olmesartan (BENICAR) 20 MG tablet; Take 1 tablet (20 mg total) by mouth once daily.  Dispense: 180 tablet; Refill: 2  -     furosemide (LASIX) 20 MG tablet; Take 1 tablet (20 mg total) by mouth daily as needed (swelling).  Dispense: 30 tablet; Refill: 2    2. Fatigue, unspecified type  Comments:  Likely due to low BP   Decrease Lasix and Olmesartan       3. Type II diabetes mellitus with peripheral autonomic neuropathy  Assessment & Plan:  Most recent A1c was 8.2, goal A1c <7.0%   Start  Mounjaro for better glycemic and weight loss   Continue ACE-I and Statin   Continue ADA diet, Counseled on importance of diet & weight loss   May be contributing to dry mouth      Orders:  -     tirzepatide 2.5 mg/0.5 mL PnIj; Inject 2.5 mg into the skin every 7 days.  Dispense: 4 pen ; Refill: 0  -     tirzepatide 5 mg/0.5 mL PnIj; Inject 5 mg into the skin every 7 days.  Dispense: 12 pen ; Refill: 3    4. Lumbar back pain with radiculopathy affecting lower extremity  Assessment & Plan:  Stable, episodic flares  Contrinue gabapentin to 900 mg b.i.d.  X-ray of the lumbar spine showed degenerative changes, failed conservative therapy and physical therapy completed multiple weeks without significant improvement   Ordered MRI and referred to Neurosurgery for further possible intervention - unable to do due to financial reasons at this time   Continue to tylenol as needed, Voltaren gel as needed      Orders:  -     gabapentin (NEURONTIN) 600 MG tablet; Take 1.5 tablets (900 mg total) by mouth 2 (two) times daily.  Dispense: 90 tablet; Refill: 11    Follow up in about 3 months (around 12/5/2023) for Diabetes. In addition to  their scheduled follow up, the patient has also been instructed to follow up on as needed basis.

## 2023-09-20 ENCOUNTER — CLINICAL SUPPORT (OUTPATIENT)
Dept: PRIMARY CARE CLINIC | Facility: CLINIC | Age: 53
End: 2023-09-20
Payer: COMMERCIAL

## 2023-09-20 VITALS — SYSTOLIC BLOOD PRESSURE: 120 MMHG | DIASTOLIC BLOOD PRESSURE: 68 MMHG

## 2023-09-20 DIAGNOSIS — I10 HYPERTENSION, UNSPECIFIED TYPE: Primary | ICD-10-CM

## 2023-09-20 NOTE — PROGRESS NOTES
Patient presented for Nurse visit Blood pressure check.  Blood pressure recorded as: 120/68 74p  Patient encourage to take medication as prescribed by    Patient educated on the importance of monitoring pressure.  Patient educated on  the importance of keeping all scheduled appointments.

## 2023-09-25 NOTE — ASSESSMENT & PLAN NOTE
Today's BP Hypotensive episodes, likely resulting in fatigue  Continue Coreg, Norvasc   Decrease Benicar to 20mg daily, and Lasix as needed  Counseled on low sodium diet, exercise, tobacco avoidance, and limiting alcohol intake   Pt. Has home BP cuff, instructed to measure home BP and report abnormal values    2 week nurse visit for BP follow up

## 2023-09-25 NOTE — ASSESSMENT & PLAN NOTE
Stable, episodic flares  Contrinue gabapentin to 900 mg b.i.d.  X-ray of the lumbar spine showed degenerative changes, failed conservative therapy and physical therapy completed multiple weeks without significant improvement   Ordered MRI and referred to Neurosurgery for further possible intervention - unable to do due to financial reasons at this time   Continue to tylenol as needed, Voltaren gel as needed

## 2023-09-25 NOTE — ASSESSMENT & PLAN NOTE
Most recent A1c was 8.2, goal A1c <7.0%   Start  Mounjaro for better glycemic and weight loss   Continue ACE-I and Statin   Continue ADA diet, Counseled on importance of diet & weight loss   May be contributing to dry mouth

## 2023-10-09 ENCOUNTER — OFFICE VISIT (OUTPATIENT)
Dept: PULMONOLOGY | Facility: CLINIC | Age: 53
End: 2023-10-09
Payer: COMMERCIAL

## 2023-10-09 VITALS
RESPIRATION RATE: 18 BRPM | BODY MASS INDEX: 36.69 KG/M2 | TEMPERATURE: 98 F | HEIGHT: 62 IN | WEIGHT: 199.38 LBS | DIASTOLIC BLOOD PRESSURE: 81 MMHG | SYSTOLIC BLOOD PRESSURE: 147 MMHG | HEART RATE: 95 BPM | OXYGEN SATURATION: 92 %

## 2023-10-09 DIAGNOSIS — R59.0 MEDIASTINAL LYMPHADENOPATHY: ICD-10-CM

## 2023-10-09 DIAGNOSIS — R91.1 LUNG NODULE, SOLITARY: Primary | ICD-10-CM

## 2023-10-09 DIAGNOSIS — R91.1 SOLITARY PULMONARY NODULE: Primary | ICD-10-CM

## 2023-10-09 PROCEDURE — 3079F PR MOST RECENT DIASTOLIC BLOOD PRESSURE 80-89 MM HG: ICD-10-PCS | Mod: CPTII,,, | Performed by: HOSPITALIST

## 2023-10-09 PROCEDURE — 3077F PR MOST RECENT SYSTOLIC BLOOD PRESSURE >= 140 MM HG: ICD-10-PCS | Mod: CPTII,,, | Performed by: HOSPITALIST

## 2023-10-09 PROCEDURE — 4010F ACE/ARB THERAPY RXD/TAKEN: CPT | Mod: CPTII,,, | Performed by: HOSPITALIST

## 2023-10-09 PROCEDURE — 3066F PR DOCUMENTATION OF TREATMENT FOR NEPHROPATHY: ICD-10-PCS | Mod: CPTII,,, | Performed by: HOSPITALIST

## 2023-10-09 PROCEDURE — 99214 PR OFFICE/OUTPT VISIT, EST, LEVL IV, 30-39 MIN: ICD-10-PCS | Mod: S$PBB,,, | Performed by: HOSPITALIST

## 2023-10-09 PROCEDURE — 1159F PR MEDICATION LIST DOCUMENTED IN MEDICAL RECORD: ICD-10-PCS | Mod: CPTII,,, | Performed by: HOSPITALIST

## 2023-10-09 PROCEDURE — 3079F DIAST BP 80-89 MM HG: CPT | Mod: CPTII,,, | Performed by: HOSPITALIST

## 2023-10-09 PROCEDURE — 3008F PR BODY MASS INDEX (BMI) DOCUMENTED: ICD-10-PCS | Mod: CPTII,,, | Performed by: HOSPITALIST

## 2023-10-09 PROCEDURE — 3008F BODY MASS INDEX DOCD: CPT | Mod: CPTII,,, | Performed by: HOSPITALIST

## 2023-10-09 PROCEDURE — 99215 OFFICE O/P EST HI 40 MIN: CPT | Mod: PBBFAC

## 2023-10-09 PROCEDURE — 4010F PR ACE/ARB THEARPY RXD/TAKEN: ICD-10-PCS | Mod: CPTII,,, | Performed by: HOSPITALIST

## 2023-10-09 PROCEDURE — 3060F PR POS MICROALBUMINURIA RESULT DOCUMENTED/REVIEW: ICD-10-PCS | Mod: CPTII,,, | Performed by: HOSPITALIST

## 2023-10-09 PROCEDURE — 3066F NEPHROPATHY DOC TX: CPT | Mod: CPTII,,, | Performed by: HOSPITALIST

## 2023-10-09 PROCEDURE — 99214 OFFICE O/P EST MOD 30 MIN: CPT | Mod: S$PBB,,, | Performed by: HOSPITALIST

## 2023-10-09 PROCEDURE — 3052F HG A1C>EQUAL 8.0%<EQUAL 9.0%: CPT | Mod: CPTII,,, | Performed by: HOSPITALIST

## 2023-10-09 PROCEDURE — 3052F PR MOST RECENT HEMOGLOBIN A1C LEVEL 8.0 - < 9.0%: ICD-10-PCS | Mod: CPTII,,, | Performed by: HOSPITALIST

## 2023-10-09 PROCEDURE — 1159F MED LIST DOCD IN RCRD: CPT | Mod: CPTII,,, | Performed by: HOSPITALIST

## 2023-10-09 PROCEDURE — 3060F POS MICROALBUMINURIA REV: CPT | Mod: CPTII,,, | Performed by: HOSPITALIST

## 2023-10-09 PROCEDURE — 3077F SYST BP >= 140 MM HG: CPT | Mod: CPTII,,, | Performed by: HOSPITALIST

## 2023-10-09 NOTE — PROGRESS NOTES
Subjective:     Chief Complaint: Est pt/ nodules, CT done 08/17/2023      HPI: Kirstie Christianson is a 52 y.o. female originally seen in the [facility] September 2022 for a 5 mm nodule seen in the right lower lobe.  She was asymptomatic at time in low suspicion and therefore observation was suggested as she was a nonsmoker and no history of malignancy in the past.    Diagnostics:  CT Chest:   3/22.  6 mm right lower lobe nodule.  2 cm subcarinal lymph node.   10/22.  6 mm right lower lobe nodule with 1.2 cm subcarinal lymph node    9/23.  Unchanged 6 mm right lower lobe fissure nodule.  Unchanged 1 cm subcarinal lymph node.    Today's visit:  Patient comes to the office today for yearly follow-up.  She feels well and has no complaints.  No chest pain.  No shortness of breath.  No weight loss or lack of appetite.    Past Medical History:   Diagnosis Date    CHF (NYHA class III, ACC/AHA Stage C)     Diabetes Mellitus II     Disorder of kidney and ureter     Hilar lymphadenopathy     Obesity, unspecified     Pulmonary nodule     Systolic heart failure          Review of Systems   Constitutional:  Negative for chills, fever and malaise/fatigue.   HENT:  Negative for sinus pain.    Respiratory:  Negative for cough, hemoptysis, sputum production, shortness of breath, wheezing and stridor.    Cardiovascular:  Negative for chest pain, palpitations, orthopnea, claudication and leg swelling.   Gastrointestinal:  Negative for constipation, heartburn and vomiting.   Musculoskeletal:  Negative for falls, joint pain, myalgias and neck pain.   Skin:  Negative for rash.   Neurological:  Negative for dizziness, speech change, focal weakness, seizures, loss of consciousness and weakness.   Psychiatric/Behavioral:  Negative for depression and suicidal ideas. The patient is not nervous/anxious.            Social History     Socioeconomic History    Marital status:    Tobacco Use    Smoking status: Never    Smokeless  tobacco: Never   Substance and Sexual Activity    Alcohol use: Not Currently     Comment: drinks occasionally    Drug use: Never    Sexual activity: Yes     Partners: Male     Comment:      Social Determinants of Health     Financial Resource Strain: Low Risk  (5/30/2023)    Overall Financial Resource Strain (CARDIA)     Difficulty of Paying Living Expenses: Not very hard   Food Insecurity: No Food Insecurity (5/30/2023)    Hunger Vital Sign     Worried About Running Out of Food in the Last Year: Never true     Ran Out of Food in the Last Year: Never true   Transportation Needs: No Transportation Needs (5/30/2023)    PRAPARE - Transportation     Lack of Transportation (Medical): No     Lack of Transportation (Non-Medical): No   Physical Activity: Insufficiently Active (5/30/2023)    Exercise Vital Sign     Days of Exercise per Week: 3 days     Minutes of Exercise per Session: 40 min   Stress: No Stress Concern Present (5/30/2023)    [employer] - Occupational Stress Questionnaire     Feeling of Stress : Not at all   Social Connections: Socially Integrated (5/30/2023)    Social Connection and Isolation Panel [NHANES]     Frequency of Communication with Friends and Family: Three times a week     Frequency of Social Gatherings with Friends and Family: Three times a week     Attends Orthodoxy Services: More than 4 times per year     Active Member of Clubs or Organizations: No     Attends Club or Organization Meetings: More than 4 times per year     Marital Status:    Housing Stability: Low Risk  (5/30/2023)    Housing Stability Vital Sign     Unable to Pay for Housing in the Last Year: No     Number of Places Lived in the Last Year: 1     Unstable Housing in the Last Year: No         Current Outpatient Medications   Medication Instructions    ACCU-CHEK GUIDE TEST STRIPS Strp USE TO CHECK BLOOD SUGAR EVERY MORNING    albuterol (PROVENTIL/VENTOLIN HFA) 90 mcg/actuation inhaler 2  "puffs, Inhalation, Every 4 hours PRN, Rescue    amLODIPine (NORVASC) 5 mg, Oral, Daily    aspirin (ECOTRIN) 81 mg, Oral, Daily    atorvastatin (LIPITOR) 80 mg, Oral    carvediloL (COREG) 25 mg, Oral, 2 times daily with meals    clotrimazole-betamethasone 1-0.05% (LOTRISONE) cream As needed (PRN)    diclofenac sodium (VOLTAREN) 2 g, Topical (Top), 4 times daily    FLUoxetine 10 mg, Oral, Daily    fluticasone propionate (FLONASE) 100 mcg, Each Nostril, 2 times daily    furosemide (LASIX) 20 mg, Oral, Daily PRN    gabapentin (NEURONTIN) 900 mg, Oral, 2 times daily    hydrocortisone 2.5 % ointment Topical (Top), 2 times daily    olmesartan (BENICAR) 20 mg, Oral, Daily    pantoprazole (PROTONIX) 20 mg, Oral, Daily    tirzepatide 2.5 mg, Subcutaneous, Every 7 days    tirzepatide 5 mg, Subcutaneous, Every 7 days    tiZANidine 4 mg, Oral, Every 8 hours PRN             Objective:   BP (!) 147/81 (BP Location: Right arm)   Pulse 95   Temp 98 °F (36.7 °C) (Oral)   Resp 18   Ht 5' 2" (1.575 m)   Wt 90.4 kg (199 lb 6.4 oz)   SpO2 (!) 92% Comment: room air  BMI 36.47 kg/m²     Physical Exam  Constitutional:       General: She is not in acute distress.     Appearance: Normal appearance. She is normal weight. She is not ill-appearing, toxic-appearing or diaphoretic.   HENT:      Head: Normocephalic and atraumatic.      Right Ear: External ear normal.      Left Ear: External ear normal.      Nose: No congestion or rhinorrhea.      Mouth/Throat:      Mouth: Mucous membranes are moist.      Pharynx: Oropharynx is clear. No oropharyngeal exudate or posterior oropharyngeal erythema.   Eyes:      General: No scleral icterus.        Right eye: No discharge.         Left eye: No discharge.      Extraocular Movements: Extraocular movements intact.      Pupils: Pupils are equal, round, and reactive to light.   Neck:      Vascular: No carotid bruit.   Cardiovascular:      Rate and Rhythm: Normal rate and regular rhythm.      Heart " sounds: Normal heart sounds. No murmur heard.     No gallop.   Pulmonary:      Effort: No respiratory distress.      Breath sounds: Normal breath sounds. No stridor. No wheezing, rhonchi or rales.   Chest:      Chest wall: No tenderness.   Abdominal:      General: Abdomen is flat. Bowel sounds are normal. There is no distension.      Palpations: Abdomen is soft. There is no mass.      Tenderness: There is no abdominal tenderness. There is no guarding or rebound.   Musculoskeletal:         General: No swelling, tenderness, deformity or signs of injury. Normal range of motion.      Cervical back: No rigidity or tenderness.      Right lower leg: No edema.      Left lower leg: No edema.   Lymphadenopathy:      Cervical: No cervical adenopathy.   Skin:     Coloration: Skin is not jaundiced.      Findings: No bruising, lesion or rash.   Neurological:      General: No focal deficit present.      Mental Status: She is alert and oriented to person, place, and time.      Cranial Nerves: No cranial nerve deficit.      Sensory: No sensory deficit.      Motor: No weakness.      Coordination: Coordination normal.      Gait: Gait normal.      Deep Tendon Reflexes: Reflexes normal.   Psychiatric:         Mood and Affect: Mood normal.         Behavior: Behavior normal.         Thought Content: Thought content normal.         Judgment: Judgment normal.           Imaging:  I have personally reviewed the pertinent recent imaging.  CTs unchanged.    Assessment:     Stable 6 mm right lower lobe nodule along with stable mediastinal adenopathy at approximately 1.2 cm in size.  Initially identified in March of 2022.    Plan:     CT of the chest in 1 year.  This will complete over 2 years of observation.  If findings remains stable then no further workup will be indicated.  Lung minus clinic follow-up in 1 year.    Garret Alvarado MD

## 2023-10-19 ENCOUNTER — TELEPHONE (OUTPATIENT)
Dept: PRIMARY CARE CLINIC | Facility: CLINIC | Age: 53
End: 2023-10-19
Payer: COMMERCIAL

## 2023-10-19 DIAGNOSIS — M54.12 CERVICAL RADICULOPATHY: Primary | Chronic | ICD-10-CM

## 2023-10-19 DIAGNOSIS — M54.16 LUMBAR BACK PAIN WITH RADICULOPATHY AFFECTING LOWER EXTREMITY: Chronic | ICD-10-CM

## 2023-10-19 RX ORDER — METHYLPREDNISOLONE 4 MG/1
TABLET ORAL
Qty: 21 EACH | Refills: 0 | Status: SHIPPED | OUTPATIENT
Start: 2023-10-19 | End: 2023-10-30

## 2023-10-19 RX ORDER — PREGABALIN 150 MG/1
150 CAPSULE ORAL 2 TIMES DAILY
Qty: 60 CAPSULE | Refills: 6 | Status: SHIPPED | OUTPATIENT
Start: 2023-10-19 | End: 2024-04-18

## 2023-10-19 RX ORDER — MELOXICAM 7.5 MG/1
7.5 TABLET ORAL DAILY
Qty: 7 TABLET | Refills: 0 | Status: SHIPPED | OUTPATIENT
Start: 2023-10-19

## 2023-10-19 NOTE — TELEPHONE ENCOUNTER
----- Message from Niki Aura sent at 10/19/2023 11:32 AM CDT -----  Regarding: orders  I got a call from this patient to schedule her and wanted to verify the orders. Patient thinks she is getting a scan of her whole body. I am only seeing orders from your office to do MRI cervical spine wo contrast and MRI lumbar spine wo contrast from February of this year. Can you please verify that is what she is needing to have scheduled and let me know? Thank you

## 2023-10-19 NOTE — TELEPHONE ENCOUNTER
----- Message from Bharat Riley MA sent at 10/19/2023 10:45 AM CDT -----  Regarding: FW: med advice  Spoke to patient   Advise of tylenol  MRI is strongly recommended   Injection process   Relief   Chronic ? yes  Acute flare up? no  Worsening at this  time  ----- Message -----  From: Maxime April  Sent: 10/19/2023  10:18 AM CDT  To: [facility] Staff  Subject: med advice                                       .Type:  Needs Medical Advice    Who Called: patient  Symptoms (please be specific): leg pain and burning   How long has patient had these symptoms:    Pharmacy name and phone #:    Would the patient rather a call back or a response via [facility]? Call back  Best Call Back Number:  [phone]  Additional Information: patient is contacting to discuss her leg pain. Please advise.

## 2023-10-19 NOTE — TELEPHONE ENCOUNTER
Will send in a medrol dos tsering and 7 days of Mobic to help with the pain. No other NSAIDs. Need to drink lots of water with it. Do not stay on long term NSAIDs.     Take up to 3,000mg of Tylenol daily for pain as needed.     Maxed out on gabapentin, can switch to lyrica to see if it helps. Stop gabapentin and start lyrica instead.     Need MRI of C spine and lumbar spine done to be able to get set up with specialist for possible injection to provide relief.     Marie Brambila MD

## 2023-10-30 ENCOUNTER — OFFICE VISIT (OUTPATIENT)
Dept: PRIMARY CARE CLINIC | Facility: CLINIC | Age: 53
End: 2023-10-30
Payer: COMMERCIAL

## 2023-10-30 VITALS
SYSTOLIC BLOOD PRESSURE: 138 MMHG | HEART RATE: 116 BPM | WEIGHT: 199 LBS | HEIGHT: 62 IN | RESPIRATION RATE: 20 BRPM | DIASTOLIC BLOOD PRESSURE: 89 MMHG | OXYGEN SATURATION: 98 % | BODY MASS INDEX: 36.62 KG/M2

## 2023-10-30 DIAGNOSIS — J06.9 URI WITH COUGH AND CONGESTION: Primary | ICD-10-CM

## 2023-10-30 DIAGNOSIS — U07.1 COVID-19: ICD-10-CM

## 2023-10-30 DIAGNOSIS — H66.001 NON-RECURRENT ACUTE SUPPURATIVE OTITIS MEDIA OF RIGHT EAR WITHOUT SPONTANEOUS RUPTURE OF TYMPANIC MEMBRANE: ICD-10-CM

## 2023-10-30 DIAGNOSIS — I50.9 ACC/AHA STAGE C CONGESTIVE HEART FAILURE: ICD-10-CM

## 2023-10-30 LAB
CTP QC/QA: YES
CTP QC/QA: YES
FLUAV AG UPPER RESP QL IA.RAPID: NOT DETECTED
FLUBV AG UPPER RESP QL IA.RAPID: NOT DETECTED
MOLECULAR STREP A: NEGATIVE
POC MOLECULAR INFLUENZA A AGN: NEGATIVE
POC MOLECULAR INFLUENZA B AGN: NEGATIVE
RSV A 5' UTR RNA NPH QL NAA+PROBE: NOT DETECTED
SARS-COV-2 RNA RESP QL NAA+PROBE: DETECTED

## 2023-10-30 PROCEDURE — 1160F PR REVIEW ALL MEDS BY PRESCRIBER/CLIN PHARMACIST DOCUMENTED: ICD-10-PCS | Mod: CPTII,,, | Performed by: STUDENT IN AN ORGANIZED HEALTH CARE EDUCATION/TRAINING PROGRAM

## 2023-10-30 PROCEDURE — 1160F RVW MEDS BY RX/DR IN RCRD: CPT | Mod: CPTII,,, | Performed by: STUDENT IN AN ORGANIZED HEALTH CARE EDUCATION/TRAINING PROGRAM

## 2023-10-30 PROCEDURE — 1159F PR MEDICATION LIST DOCUMENTED IN MEDICAL RECORD: ICD-10-PCS | Mod: CPTII,,, | Performed by: STUDENT IN AN ORGANIZED HEALTH CARE EDUCATION/TRAINING PROGRAM

## 2023-10-30 PROCEDURE — 4010F PR ACE/ARB THEARPY RXD/TAKEN: ICD-10-PCS | Mod: CPTII,,, | Performed by: STUDENT IN AN ORGANIZED HEALTH CARE EDUCATION/TRAINING PROGRAM

## 2023-10-30 PROCEDURE — 87502 INFLUENZA DNA AMP PROBE: CPT | Mod: QW,,, | Performed by: STUDENT IN AN ORGANIZED HEALTH CARE EDUCATION/TRAINING PROGRAM

## 2023-10-30 PROCEDURE — 1159F MED LIST DOCD IN RCRD: CPT | Mod: CPTII,,, | Performed by: STUDENT IN AN ORGANIZED HEALTH CARE EDUCATION/TRAINING PROGRAM

## 2023-10-30 PROCEDURE — 3066F PR DOCUMENTATION OF TREATMENT FOR NEPHROPATHY: ICD-10-PCS | Mod: CPTII,,, | Performed by: STUDENT IN AN ORGANIZED HEALTH CARE EDUCATION/TRAINING PROGRAM

## 2023-10-30 PROCEDURE — 3052F HG A1C>EQUAL 8.0%<EQUAL 9.0%: CPT | Mod: CPTII,,, | Performed by: STUDENT IN AN ORGANIZED HEALTH CARE EDUCATION/TRAINING PROGRAM

## 2023-10-30 PROCEDURE — 87651 STREP A DNA AMP PROBE: CPT | Mod: QW,,, | Performed by: STUDENT IN AN ORGANIZED HEALTH CARE EDUCATION/TRAINING PROGRAM

## 2023-10-30 PROCEDURE — 99213 PR OFFICE/OUTPT VISIT, EST, LEVL III, 20-29 MIN: ICD-10-PCS | Mod: ,,, | Performed by: STUDENT IN AN ORGANIZED HEALTH CARE EDUCATION/TRAINING PROGRAM

## 2023-10-30 PROCEDURE — 3008F BODY MASS INDEX DOCD: CPT | Mod: CPTII,,, | Performed by: STUDENT IN AN ORGANIZED HEALTH CARE EDUCATION/TRAINING PROGRAM

## 2023-10-30 PROCEDURE — 0241U COVID/RSV/FLU A&B PCR: CPT | Performed by: STUDENT IN AN ORGANIZED HEALTH CARE EDUCATION/TRAINING PROGRAM

## 2023-10-30 PROCEDURE — 3079F PR MOST RECENT DIASTOLIC BLOOD PRESSURE 80-89 MM HG: ICD-10-PCS | Mod: CPTII,,, | Performed by: STUDENT IN AN ORGANIZED HEALTH CARE EDUCATION/TRAINING PROGRAM

## 2023-10-30 PROCEDURE — 3066F NEPHROPATHY DOC TX: CPT | Mod: CPTII,,, | Performed by: STUDENT IN AN ORGANIZED HEALTH CARE EDUCATION/TRAINING PROGRAM

## 2023-10-30 PROCEDURE — 3075F PR MOST RECENT SYSTOLIC BLOOD PRESS GE 130-139MM HG: ICD-10-PCS | Mod: CPTII,,, | Performed by: STUDENT IN AN ORGANIZED HEALTH CARE EDUCATION/TRAINING PROGRAM

## 2023-10-30 PROCEDURE — 3052F PR MOST RECENT HEMOGLOBIN A1C LEVEL 8.0 - < 9.0%: ICD-10-PCS | Mod: CPTII,,, | Performed by: STUDENT IN AN ORGANIZED HEALTH CARE EDUCATION/TRAINING PROGRAM

## 2023-10-30 PROCEDURE — 3060F PR POS MICROALBUMINURIA RESULT DOCUMENTED/REVIEW: ICD-10-PCS | Mod: CPTII,,, | Performed by: STUDENT IN AN ORGANIZED HEALTH CARE EDUCATION/TRAINING PROGRAM

## 2023-10-30 PROCEDURE — 99213 OFFICE O/P EST LOW 20 MIN: CPT | Mod: ,,, | Performed by: STUDENT IN AN ORGANIZED HEALTH CARE EDUCATION/TRAINING PROGRAM

## 2023-10-30 PROCEDURE — 87502 POCT INFLUENZA A/B MOLECULAR: ICD-10-PCS | Mod: QW,,, | Performed by: STUDENT IN AN ORGANIZED HEALTH CARE EDUCATION/TRAINING PROGRAM

## 2023-10-30 PROCEDURE — 3075F SYST BP GE 130 - 139MM HG: CPT | Mod: CPTII,,, | Performed by: STUDENT IN AN ORGANIZED HEALTH CARE EDUCATION/TRAINING PROGRAM

## 2023-10-30 PROCEDURE — 4010F ACE/ARB THERAPY RXD/TAKEN: CPT | Mod: CPTII,,, | Performed by: STUDENT IN AN ORGANIZED HEALTH CARE EDUCATION/TRAINING PROGRAM

## 2023-10-30 PROCEDURE — 3008F PR BODY MASS INDEX (BMI) DOCUMENTED: ICD-10-PCS | Mod: CPTII,,, | Performed by: STUDENT IN AN ORGANIZED HEALTH CARE EDUCATION/TRAINING PROGRAM

## 2023-10-30 PROCEDURE — 3060F POS MICROALBUMINURIA REV: CPT | Mod: CPTII,,, | Performed by: STUDENT IN AN ORGANIZED HEALTH CARE EDUCATION/TRAINING PROGRAM

## 2023-10-30 PROCEDURE — 87651 POCT STREP A MOLECULAR: ICD-10-PCS | Mod: QW,,, | Performed by: STUDENT IN AN ORGANIZED HEALTH CARE EDUCATION/TRAINING PROGRAM

## 2023-10-30 PROCEDURE — 3079F DIAST BP 80-89 MM HG: CPT | Mod: CPTII,,, | Performed by: STUDENT IN AN ORGANIZED HEALTH CARE EDUCATION/TRAINING PROGRAM

## 2023-10-30 RX ORDER — HYDROXYZINE PAMOATE 25 MG/1
25 CAPSULE ORAL NIGHTLY PRN
Qty: 50 CAPSULE | Refills: 2 | Status: SHIPPED | OUTPATIENT
Start: 2023-10-30

## 2023-10-30 RX ORDER — DOXYCYCLINE 100 MG/1
100 CAPSULE ORAL EVERY 12 HOURS
Qty: 14 CAPSULE | Refills: 0 | Status: SHIPPED | OUTPATIENT
Start: 2023-10-30 | End: 2023-11-06

## 2023-10-30 RX ORDER — DEXAMETHASONE SODIUM PHOSPHATE 4 MG/ML
4 INJECTION, SOLUTION INTRA-ARTICULAR; INTRALESIONAL; INTRAMUSCULAR; INTRAVENOUS; SOFT TISSUE
Status: SHIPPED | OUTPATIENT
Start: 2023-10-30

## 2023-10-30 RX ORDER — MONTELUKAST SODIUM 10 MG/1
10 TABLET ORAL NIGHTLY
Qty: 30 TABLET | Refills: 0 | Status: SHIPPED | OUTPATIENT
Start: 2023-10-30 | End: 2023-11-29

## 2023-10-30 RX ORDER — METHYLPREDNISOLONE 4 MG/1
TABLET ORAL
Qty: 21 EACH | Refills: 0 | Status: SHIPPED | OUTPATIENT
Start: 2023-10-30 | End: 2023-11-20

## 2023-10-30 RX ORDER — FLUTICASONE PROPIONATE 50 MCG
2 SPRAY, SUSPENSION (ML) NASAL 2 TIMES DAILY
Qty: 15.8 ML | Refills: 3 | Status: SHIPPED | OUTPATIENT
Start: 2023-10-30

## 2023-10-30 RX ORDER — CETIRIZINE HYDROCHLORIDE 10 MG/1
10 TABLET ORAL DAILY
Qty: 90 TABLET | Refills: 3 | Status: SHIPPED | OUTPATIENT
Start: 2023-10-30 | End: 2024-10-29

## 2023-10-30 NOTE — PROGRESS NOTES
Subjective:       Patient ID: Kirstie Christianson is a 53 y.o. female.    Past Medical History:   CHF (NYHA class III, ACC/AHA Stage C)  Diabetes Mellitus II  Disorder of kidney and ureter  Hilar lymphadenopathy  Obesity, unspecified  Pulmonary nodule  Systolic heart failure     Chief Complaint: Cough, Fever, Generalized Body Aches, Nasal Congestion, and Fatigue    Presents today for sick same-day visit.  Endorsed cough, fever, chills, generalized body aches, sore throat, nasal congestion since Saturday.  Has taken hardly anything as she was on aware of what she should be taking.  Feels like her symptoms are worsening.      Review of Systems   Constitutional:  Positive for chills, fatigue and fever.   HENT:  Positive for nasal congestion, postnasal drip, rhinorrhea, sinus pressure/congestion and sore throat.    Respiratory:  Negative for cough and shortness of breath.    Cardiovascular:  Negative for chest pain.   Gastrointestinal:  Negative for abdominal pain, diarrhea, nausea and vomiting.   Musculoskeletal:  Positive for myalgias.         Objective:      Physical Exam  Vitals and nursing note reviewed.   Constitutional:       General: She is not in acute distress.     Appearance: Normal appearance. She is ill-appearing.   HENT:      Right Ear: A middle ear effusion is present. Tympanic membrane is erythematous.      Left Ear: Tympanic membrane normal.      Nose: Congestion and rhinorrhea present. Rhinorrhea is clear.      Right Turbinates: Enlarged.      Left Turbinates: Enlarged.      Right Sinus: Frontal sinus tenderness present.      Left Sinus: No frontal sinus tenderness.      Mouth/Throat:      Mouth: Mucous membranes are moist.      Pharynx: Posterior oropharyngeal erythema present.   Eyes:      General: No scleral icterus.     Conjunctiva/sclera: Conjunctivae normal.   Cardiovascular:      Rate and Rhythm: Regular rhythm. Tachycardia present.      Heart sounds: Normal heart sounds. No murmur heard.  Pulmonary:       Effort: Pulmonary effort is normal. No respiratory distress.      Breath sounds: Normal breath sounds. No wheezing.   Skin:     General: Skin is warm and dry.      Coloration: Skin is not jaundiced.   Neurological:      Mental Status: She is alert. Mental status is at baseline.      Gait: Gait normal.         Assessment & Plan:   1. URI with cough and congestion  -     COVID/RSV/FLU A&B PCR; Future; Expected date: 10/30/2023  -     POCT Influenza A/B Molecular  -     POCT Strep A, Molecular  -     dexAMETHasone injection 4 mg  -     methylPREDNISolone (MEDROL DOSEPACK) 4 mg tablet; use as directed  Dispense: 21 each; Refill: 0  -     doxycycline (MONODOX) 100 MG capsule; Take 1 capsule (100 mg total) by mouth every 12 (twelve) hours. for 7 days  Dispense: 14 capsule; Refill: 0  -     montelukast (SINGULAIR) 10 mg tablet; Take 1 tablet (10 mg total) by mouth every evening.  Dispense: 30 tablet; Refill: 0  -     hydrOXYzine pamoate (VISTARIL) 25 MG Cap; Take 1 capsule (25 mg total) by mouth nightly as needed (nasal congestion).  Dispense: 50 capsule; Refill: 2  -     cetirizine (ZYRTEC) 10 MG tablet; Take 1 tablet (10 mg total) by mouth once daily.  Dispense: 90 tablet; Refill: 3  -     fluticasone propionate (FLONASE) 50 mcg/actuation nasal spray; 2 sprays (100 mcg total) by Each Nostril route 2 (two) times a day.  Dispense: 15.8 mL; Refill: 3    2. Non-recurrent acute suppurative otitis media of right ear without spontaneous rupture of tympanic membrane  -     COVID/RSV/FLU A&B PCR; Future; Expected date: 10/30/2023  -     POCT Influenza A/B Molecular  -     POCT Strep A, Molecular  -     methylPREDNISolone (MEDROL DOSEPACK) 4 mg tablet; use as directed  Dispense: 21 each; Refill: 0  -     doxycycline (MONODOX) 100 MG capsule; Take 1 capsule (100 mg total) by mouth every 12 (twelve) hours. for 7 days  Dispense: 14 capsule; Refill: 0  -     montelukast (SINGULAIR) 10 mg tablet; Take 1 tablet (10 mg total) by  mouth every evening.  Dispense: 30 tablet; Refill: 0  -     hydrOXYzine pamoate (VISTARIL) 25 MG Cap; Take 1 capsule (25 mg total) by mouth nightly as needed (nasal congestion).  Dispense: 50 capsule; Refill: 2  -     cetirizine (ZYRTEC) 10 MG tablet; Take 1 tablet (10 mg total) by mouth once daily.  Dispense: 90 tablet; Refill: 3  -     fluticasone propionate (FLONASE) 50 mcg/actuation nasal spray; 2 sprays (100 mcg total) by Each Nostril route 2 (two) times a day.  Dispense: 15.8 mL; Refill: 3    3. ACC/AHA stage C congestive heart failure  Assessment & Plan:  Stable   Euvolemic on today's visit   Non-ischemic Cardiomyopathy, LHC negative in 2021  Echocardiogram in 2021 showed EF of 30-35%  Continue Olmesartan, Coreg, Lasix  Defer management to Cardiology  Weigh self daily - if 2-3lbs in 24hrs or 5lbs in 1 week, take extra dose of lasix     Follow up if symptoms worsen or fail to improve. In addition to their scheduled follow up, the patient has also been instructed to follow up on as needed basis.

## 2023-10-30 NOTE — ASSESSMENT & PLAN NOTE
Stable   Euvolemic on today's visit   Non-ischemic Cardiomyopathy, LHC negative in 2021  Echocardiogram in 2021 showed EF of 30-35%  Continue Olmesartan, Coreg, Lasix  Defer management to Cardiology  Weigh self daily - if 2-3lbs in 24hrs or 5lbs in 1 week, take extra dose of lasix

## 2023-12-14 ENCOUNTER — OFFICE VISIT (OUTPATIENT)
Dept: PRIMARY CARE CLINIC | Facility: CLINIC | Age: 53
End: 2023-12-14
Payer: COMMERCIAL

## 2023-12-14 VITALS
OXYGEN SATURATION: 98 % | WEIGHT: 189.19 LBS | HEIGHT: 60 IN | TEMPERATURE: 98 F | SYSTOLIC BLOOD PRESSURE: 118 MMHG | DIASTOLIC BLOOD PRESSURE: 84 MMHG | BODY MASS INDEX: 37.15 KG/M2 | RESPIRATION RATE: 20 BRPM | HEART RATE: 97 BPM

## 2023-12-14 DIAGNOSIS — R31.9 HEMATURIA, UNSPECIFIED TYPE: Primary | ICD-10-CM

## 2023-12-14 DIAGNOSIS — E11.43 TYPE II DIABETES MELLITUS WITH PERIPHERAL AUTONOMIC NEUROPATHY: Chronic | ICD-10-CM

## 2023-12-14 DIAGNOSIS — E11.43 TYPE II DIABETES MELLITUS WITH PERIPHERAL AUTONOMIC NEUROPATHY: Primary | Chronic | ICD-10-CM

## 2023-12-14 DIAGNOSIS — R39.9 URINARY TRACT INFECTION SYMPTOMS: ICD-10-CM

## 2023-12-14 LAB
BILIRUB SERPL-MCNC: NORMAL MG/DL
BLOOD URINE, POC: NORMAL
CLARITY, POC UA: NORMAL
COLOR, POC UA: NORMAL
GLUCOSE UR QL STRIP: NORMAL
HBA1C MFR BLD: 6.8 %
KETONES UR QL STRIP: NORMAL
LEUKOCYTE ESTERASE URINE, POC: NORMAL
NITRITE, POC UA: NORMAL
PH, POC UA: 5.5
PROTEIN, POC: NORMAL
SPECIFIC GRAVITY, POC UA: NORMAL
UROBILINOGEN, POC UA: 0.2

## 2023-12-14 PROCEDURE — 1159F MED LIST DOCD IN RCRD: CPT | Mod: CPTII,,, | Performed by: STUDENT IN AN ORGANIZED HEALTH CARE EDUCATION/TRAINING PROGRAM

## 2023-12-14 PROCEDURE — 81002 URINALYSIS NONAUTO W/O SCOPE: CPT | Mod: ,,, | Performed by: STUDENT IN AN ORGANIZED HEALTH CARE EDUCATION/TRAINING PROGRAM

## 2023-12-14 PROCEDURE — 3008F BODY MASS INDEX DOCD: CPT | Mod: CPTII,,, | Performed by: STUDENT IN AN ORGANIZED HEALTH CARE EDUCATION/TRAINING PROGRAM

## 2023-12-14 PROCEDURE — 83036 HEMOGLOBIN GLYCOSYLATED A1C: CPT | Mod: QW,,, | Performed by: STUDENT IN AN ORGANIZED HEALTH CARE EDUCATION/TRAINING PROGRAM

## 2023-12-14 PROCEDURE — 83036 POCT HEMOGLOBIN A1C: ICD-10-PCS | Mod: QW,,, | Performed by: STUDENT IN AN ORGANIZED HEALTH CARE EDUCATION/TRAINING PROGRAM

## 2023-12-14 PROCEDURE — 3079F DIAST BP 80-89 MM HG: CPT | Mod: CPTII,,, | Performed by: STUDENT IN AN ORGANIZED HEALTH CARE EDUCATION/TRAINING PROGRAM

## 2023-12-14 PROCEDURE — 99213 OFFICE O/P EST LOW 20 MIN: CPT | Mod: ,,, | Performed by: STUDENT IN AN ORGANIZED HEALTH CARE EDUCATION/TRAINING PROGRAM

## 2023-12-14 PROCEDURE — 1160F RVW MEDS BY RX/DR IN RCRD: CPT | Mod: CPTII,,, | Performed by: STUDENT IN AN ORGANIZED HEALTH CARE EDUCATION/TRAINING PROGRAM

## 2023-12-14 PROCEDURE — 3074F SYST BP LT 130 MM HG: CPT | Mod: CPTII,,, | Performed by: STUDENT IN AN ORGANIZED HEALTH CARE EDUCATION/TRAINING PROGRAM

## 2023-12-14 RX ORDER — NITROFURANTOIN 25; 75 MG/1; MG/1
100 CAPSULE ORAL 2 TIMES DAILY
Qty: 14 CAPSULE | Refills: 0 | Status: SHIPPED | OUTPATIENT
Start: 2023-12-14 | End: 2023-12-21

## 2023-12-14 RX ORDER — PHENAZOPYRIDINE HYDROCHLORIDE 200 MG/1
200 TABLET, FILM COATED ORAL 3 TIMES DAILY PRN
Qty: 30 TABLET | Refills: 0 | Status: SHIPPED | OUTPATIENT
Start: 2023-12-14 | End: 2023-12-24

## 2024-01-13 NOTE — PROGRESS NOTES
Subjective:       Patient ID: Kirstie Christianson is a 53 y.o. female.    Past Medical History:   CHF (NYHA class III, ACC/AHA Stage C)  Diabetes Mellitus II  Disorder of kidney and ureter  Hilar lymphadenopathy  Obesity, unspecified  Pulmonary nodule  Systolic heart failure     Chief Complaint: Diabetes and Urinary Tract Infection (Blood when wiped after urination )    Presents to the clinic for same day visit. Endorsed urinary tract infection symptoms and hematuria. Noticed blood when wiping after urination.       Review of Systems   Constitutional:  Negative for chills and fever.   Respiratory:  Negative for cough.    Cardiovascular:  Negative for chest pain.   Gastrointestinal:  Negative for abdominal pain and vomiting.   Genitourinary:  Positive for dysuria and hematuria.         Objective:      Physical Exam  Vitals and nursing note reviewed.   Constitutional:       General: She is not in acute distress.     Appearance: Normal appearance. She is not ill-appearing.   HENT:      Mouth/Throat:      Mouth: Mucous membranes are moist.   Eyes:      General: No scleral icterus.     Conjunctiva/sclera: Conjunctivae normal.   Cardiovascular:      Rate and Rhythm: Normal rate and regular rhythm.      Pulses: Normal pulses.      Heart sounds: Normal heart sounds. No murmur heard.  Pulmonary:      Effort: Pulmonary effort is normal. No respiratory distress.      Breath sounds: Normal breath sounds. No wheezing.   Abdominal:      Palpations: Abdomen is soft.      Tenderness: There is no abdominal tenderness.   Musculoskeletal:      Right lower leg: No edema.      Left lower leg: No edema.   Skin:     General: Skin is warm.      Coloration: Skin is not jaundiced.   Neurological:      Mental Status: She is alert. Mental status is at baseline.      Gait: Gait normal.   Psychiatric:         Mood and Affect: Mood normal.         Behavior: Behavior normal.           Assessment & Plan:   1. Hematuria, unspecified type  -      Urinalysis, Reflex to Urine Culture; Future; Expected date: 12/14/2023  -     POCT URINE DIPSTICK WITHOUT MICROSCOPE  -     nitrofurantoin, macrocrystal-monohydrate, (MACROBID) 100 MG capsule; Take 1 capsule (100 mg total) by mouth 2 (two) times daily. for 7 days  Dispense: 14 capsule; Refill: 0  -     phenazopyridine (PYRIDIUM) 200 MG tablet; Take 1 tablet (200 mg total) by mouth 3 (three) times daily as needed for Pain.  Dispense: 30 tablet; Refill: 0    2. Urinary tract infection symptoms  -     nitrofurantoin, macrocrystal-monohydrate, (MACROBID) 100 MG capsule; Take 1 capsule (100 mg total) by mouth 2 (two) times daily. for 7 days  Dispense: 14 capsule; Refill: 0  -     phenazopyridine (PYRIDIUM) 200 MG tablet; Take 1 tablet (200 mg total) by mouth 3 (three) times daily as needed for Pain.  Dispense: 30 tablet; Refill: 0    3. Type II diabetes mellitus with peripheral autonomic neuropathy  Assessment & Plan:  Most recent A1c was 6.8, goal A1c <7.0%   Continue Mounjaro for better glycemic and weight loss   Continue ACE-I and Statin   Continue ADA diet, Counseled on importance of diet & weight loss         Follow up if symptoms worsen or fail to improve. In addition to their scheduled follow up, the patient has also been instructed to follow up on as needed basis.

## 2024-01-13 NOTE — ASSESSMENT & PLAN NOTE
Most recent A1c was 6.8, goal A1c <7.0%   Continue Mounjaro for better glycemic and weight loss   Continue ACE-I and Statin   Continue ADA diet, Counseled on importance of diet & weight loss

## 2024-01-24 ENCOUNTER — TELEPHONE (OUTPATIENT)
Dept: PRIMARY CARE CLINIC | Facility: CLINIC | Age: 54
End: 2024-01-24
Payer: COMMERCIAL

## 2024-01-24 DIAGNOSIS — E11.43 TYPE II DIABETES MELLITUS WITH PERIPHERAL AUTONOMIC NEUROPATHY: Chronic | ICD-10-CM

## 2024-01-26 ENCOUNTER — TELEPHONE (OUTPATIENT)
Dept: PRIMARY CARE CLINIC | Facility: CLINIC | Age: 54
End: 2024-01-26
Payer: COMMERCIAL

## 2024-02-08 ENCOUNTER — LAB VISIT (OUTPATIENT)
Dept: LAB | Facility: HOSPITAL | Age: 54
End: 2024-02-08
Attending: STUDENT IN AN ORGANIZED HEALTH CARE EDUCATION/TRAINING PROGRAM
Payer: COMMERCIAL

## 2024-02-08 DIAGNOSIS — R31.9 HEMATURIA, UNSPECIFIED TYPE: Primary | ICD-10-CM

## 2024-02-08 DIAGNOSIS — R31.9 HEMATURIA, UNSPECIFIED TYPE: ICD-10-CM

## 2024-02-08 LAB
APPEARANCE UR: CLEAR
BACTERIA #/AREA URNS AUTO: ABNORMAL /HPF
BILIRUB UR QL STRIP.AUTO: NEGATIVE
COLOR UR AUTO: YELLOW
GLUCOSE UR QL STRIP.AUTO: NORMAL
KETONES UR QL STRIP.AUTO: NEGATIVE
LEUKOCYTE ESTERASE UR QL STRIP.AUTO: 500
MUCOUS THREADS URNS QL MICRO: ABNORMAL /LPF
NITRITE UR QL STRIP.AUTO: NEGATIVE
PH UR STRIP.AUTO: 5.5 [PH]
PROT UR QL STRIP.AUTO: NEGATIVE
RBC #/AREA URNS AUTO: ABNORMAL /HPF
RBC UR QL AUTO: ABNORMAL
SP GR UR STRIP.AUTO: 1.02 (ref 1–1.03)
SQUAMOUS #/AREA URNS LPF: ABNORMAL /HPF
UROBILINOGEN UR STRIP-ACNC: NORMAL
WBC #/AREA URNS AUTO: ABNORMAL /HPF

## 2024-02-08 PROCEDURE — 81001 URINALYSIS AUTO W/SCOPE: CPT

## 2024-02-08 PROCEDURE — 87086 URINE CULTURE/COLONY COUNT: CPT

## 2024-02-09 ENCOUNTER — TELEPHONE (OUTPATIENT)
Dept: PRIMARY CARE CLINIC | Facility: CLINIC | Age: 54
End: 2024-02-09
Payer: COMMERCIAL

## 2024-02-09 DIAGNOSIS — R30.0 DYSURIA: ICD-10-CM

## 2024-02-09 DIAGNOSIS — R31.9 HEMATURIA, UNSPECIFIED TYPE: Primary | ICD-10-CM

## 2024-02-09 DIAGNOSIS — N12 PYELONEPHRITIS: ICD-10-CM

## 2024-02-09 RX ORDER — CIPROFLOXACIN 500 MG/1
500 TABLET ORAL EVERY 12 HOURS
Qty: 14 TABLET | Refills: 0 | Status: SHIPPED | OUTPATIENT
Start: 2024-02-09 | End: 2024-03-15 | Stop reason: ALTCHOICE

## 2024-02-09 NOTE — TELEPHONE ENCOUNTER
She again has blood in her urine sample. There's also some bacteria and white blood cells present. However, the amount of blood present is higher than these others. This may be a urine infection and kidney function (since she's having the flank pain). It could also be a kidney stone.     I'm placing an order for an ultrasound of her kidneys. Also sending Rx for ciprofloxacin.     If she develops high fevers (>102), nausea/vomiting, severe pain, etc then she would need to present to the ED.    Ezio Brambila MD

## 2024-02-09 NOTE — TELEPHONE ENCOUNTER
----- Message from Shannan Ruby LPN sent at 2/8/2024 11:07 AM CST -----  Regarding: FW: med advice  Pt states that she has a small amount of blood in her urine that started yesterday. She is experiencing left side back pain. Order placed for urine collection. Pt stated she will give sample at [facility] today.     ----- Message -----  From: Lillie Taveras  Sent: 2/8/2024  10:58 AM CST  To: [facility] Staff  Subject: med advice                                       .Type:  Needs Medical Advice    Who Called: Pt  Symptoms (please be specific): Bleeding when urinating   How long has patient had these symptoms:    Pharmacy name and phone #:[id number] - [place] - [address]     Would the patient rather a call back or a response via [facility]? Call back  Best Call Back Number: [phone]  Additional Information: Can come give sample of urine in needed, pt is off today. Thinking its another UTI.

## 2024-02-10 LAB — BACTERIA UR CULT: NORMAL

## 2024-02-16 ENCOUNTER — TELEPHONE (OUTPATIENT)
Dept: PRIMARY CARE CLINIC | Facility: CLINIC | Age: 54
End: 2024-02-16
Payer: COMMERCIAL

## 2024-02-16 DIAGNOSIS — N20.0 NEPHROLITHIASIS: ICD-10-CM

## 2024-02-16 DIAGNOSIS — M54.9 BACK PAIN, UNSPECIFIED BACK LOCATION, UNSPECIFIED BACK PAIN LATERALITY, UNSPECIFIED CHRONICITY: Primary | ICD-10-CM

## 2024-02-16 DIAGNOSIS — R10.9 FLANK PAIN: ICD-10-CM

## 2024-02-16 DIAGNOSIS — R31.9 HEMATURIA, UNSPECIFIED TYPE: ICD-10-CM

## 2024-02-16 RX ORDER — BACLOFEN 10 MG/1
10 TABLET ORAL 3 TIMES DAILY PRN
Qty: 15 TABLET | Refills: 0 | Status: SHIPPED | OUTPATIENT
Start: 2024-02-16

## 2024-02-16 NOTE — TELEPHONE ENCOUNTER
"Spoke to patient on phone. Reviewed US result which revealed a 3 mm non-obstructing L kidney stone.     Reports pain in left lower back, just above left buttocks. Long h/o back pain. Lumbar XR 2022 shows advanced L5-S1 facet arthropathy.    She also reports pelvic cramping and pain. Pain sometimes seems to originate from L flank/back and radiate to L groin.     Reports bleeding when wiping but unsure if urinary source or vaginal source. She is post-menopausal. Reports pelvic cramping does feel like "period cramps". Has upcoming appointment with GYN in April.         Given the above symptoms, concern remains high for passage of kidney stone that ultrasound may have missed. Will proceed with CT abdomen pelvis which could also give insight to any gynecologic issues.     Also sent Rx for baclofen (zanaflex not effective and flexeril contraindicated in CHF) for back pain.     Pt expressed understanding of above. All questions answered.    Ezio Brambila MD    "

## 2024-03-15 ENCOUNTER — TELEPHONE (OUTPATIENT)
Dept: PRIMARY CARE CLINIC | Facility: CLINIC | Age: 54
End: 2024-03-15
Payer: COMMERCIAL

## 2024-03-15 DIAGNOSIS — B37.31 VAGINAL YEAST INFECTION: Primary | ICD-10-CM

## 2024-03-15 RX ORDER — FLUCONAZOLE 150 MG/1
TABLET ORAL
Qty: 2 TABLET | Refills: 0 | Status: SHIPPED | OUTPATIENT
Start: 2024-03-15

## 2024-03-15 NOTE — TELEPHONE ENCOUNTER
Spoke to patient she reports  recently had a round of antibiotic.  Patient is reporting discharge with strong odor is requesting rx medication for yeast infection.

## 2024-04-15 ENCOUNTER — OFFICE VISIT (OUTPATIENT)
Dept: PRIMARY CARE CLINIC | Facility: CLINIC | Age: 54
End: 2024-04-15
Payer: COMMERCIAL

## 2024-04-15 VITALS
WEIGHT: 178 LBS | SYSTOLIC BLOOD PRESSURE: 117 MMHG | RESPIRATION RATE: 20 BRPM | DIASTOLIC BLOOD PRESSURE: 80 MMHG | BODY MASS INDEX: 34.95 KG/M2 | OXYGEN SATURATION: 98 % | TEMPERATURE: 98 F | HEART RATE: 91 BPM | HEIGHT: 60 IN

## 2024-04-15 DIAGNOSIS — Z00.00 WELLNESS EXAMINATION: ICD-10-CM

## 2024-04-15 DIAGNOSIS — I50.9 ACC/AHA STAGE C CONGESTIVE HEART FAILURE: ICD-10-CM

## 2024-04-15 DIAGNOSIS — K21.9 GASTROESOPHAGEAL REFLUX DISEASE, UNSPECIFIED WHETHER ESOPHAGITIS PRESENT: Chronic | ICD-10-CM

## 2024-04-15 DIAGNOSIS — I10 PRIMARY HYPERTENSION: Chronic | ICD-10-CM

## 2024-04-15 DIAGNOSIS — E11.59 TYPE 2 DIABETES MELLITUS WITH OTHER CIRCULATORY COMPLICATION, WITHOUT LONG-TERM CURRENT USE OF INSULIN: Primary | Chronic | ICD-10-CM

## 2024-04-15 DIAGNOSIS — E66.9 CLASS 1 OBESITY WITH SERIOUS COMORBIDITY AND BODY MASS INDEX (BMI) OF 34.0 TO 34.9 IN ADULT, UNSPECIFIED OBESITY TYPE: ICD-10-CM

## 2024-04-15 DIAGNOSIS — Z12.31 ENCOUNTER FOR SCREENING MAMMOGRAM FOR BREAST CANCER: ICD-10-CM

## 2024-04-15 DIAGNOSIS — E78.2 MIXED HYPERLIPIDEMIA: Chronic | ICD-10-CM

## 2024-04-15 PROBLEM — E66.811 CLASS 1 OBESITY WITH SERIOUS COMORBIDITY AND BODY MASS INDEX (BMI) OF 34.0 TO 34.9 IN ADULT: Status: ACTIVE | Noted: 2022-10-10

## 2024-04-15 LAB — HBA1C MFR BLD: 5.8 %

## 2024-04-15 PROCEDURE — 1159F MED LIST DOCD IN RCRD: CPT | Mod: CPTII,,, | Performed by: STUDENT IN AN ORGANIZED HEALTH CARE EDUCATION/TRAINING PROGRAM

## 2024-04-15 PROCEDURE — 1160F RVW MEDS BY RX/DR IN RCRD: CPT | Mod: CPTII,,, | Performed by: STUDENT IN AN ORGANIZED HEALTH CARE EDUCATION/TRAINING PROGRAM

## 2024-04-15 PROCEDURE — 99214 OFFICE O/P EST MOD 30 MIN: CPT | Mod: ,,, | Performed by: STUDENT IN AN ORGANIZED HEALTH CARE EDUCATION/TRAINING PROGRAM

## 2024-04-15 PROCEDURE — 3074F SYST BP LT 130 MM HG: CPT | Mod: CPTII,,, | Performed by: STUDENT IN AN ORGANIZED HEALTH CARE EDUCATION/TRAINING PROGRAM

## 2024-04-15 PROCEDURE — 3044F HG A1C LEVEL LT 7.0%: CPT | Mod: CPTII,,, | Performed by: STUDENT IN AN ORGANIZED HEALTH CARE EDUCATION/TRAINING PROGRAM

## 2024-04-15 PROCEDURE — 83036 HEMOGLOBIN GLYCOSYLATED A1C: CPT | Mod: QW,,, | Performed by: STUDENT IN AN ORGANIZED HEALTH CARE EDUCATION/TRAINING PROGRAM

## 2024-04-15 PROCEDURE — 3079F DIAST BP 80-89 MM HG: CPT | Mod: CPTII,,, | Performed by: STUDENT IN AN ORGANIZED HEALTH CARE EDUCATION/TRAINING PROGRAM

## 2024-04-15 PROCEDURE — 3008F BODY MASS INDEX DOCD: CPT | Mod: CPTII,,, | Performed by: STUDENT IN AN ORGANIZED HEALTH CARE EDUCATION/TRAINING PROGRAM

## 2024-04-15 RX ORDER — PANTOPRAZOLE SODIUM 20 MG/1
40 TABLET, DELAYED RELEASE ORAL DAILY
Qty: 180 TABLET | Refills: 3 | Status: SHIPPED | OUTPATIENT
Start: 2024-04-15 | End: 2025-04-15

## 2024-04-15 NOTE — PROGRESS NOTES
Goal Outcome Evaluation:  Plan of Care Reviewed With: patient, family           Outcome Summary: Patient is a 89 year old female who presents with minimal mobility deficits. She will benefit from skilled PT to address these deficits to help with gait training.   Subjective:       Patient ID: Kirstie Christianson is a 53 y.o. female.    -------------------------------------    CHF (NYHA class III, ACC/AHA Stage C)    Diabetes Mellitus II    Disorder of kidney and ureter    Hilar lymphadenopathy    Obesity, unspecified    Pulmonary nodule    Systolic heart failure      Chief Complaint: Diabetes and Diarrhea (Since 4/2/ was off of mounjaro 1 week shortage at pharmacy,)    Presents to the clinic for follow-up.  POC A1c was 5.8.  Patient states that she has been having some reflux as well as diarrhea since having to miss a couple of doses of her mounjaro due to the national shortage.  Patient is back on taking the mounjaro.  States that her symptoms are slowly improving.  She believes that her body just needs to get   He used to the medication.      Review of Systems   Constitutional:  Negative for chills and fever.   Respiratory:  Negative for cough and shortness of breath.    Cardiovascular:  Negative for chest pain and leg swelling.   Gastrointestinal:  Positive for diarrhea and reflux. Negative for abdominal pain, nausea and vomiting.   Genitourinary:  Negative for dysuria and hematuria.         Objective:      Physical Exam  Vitals and nursing note reviewed.   Constitutional:       General: She is not in acute distress.     Appearance: Normal appearance. She is not ill-appearing.   HENT:      Head: Normocephalic.      Nose: Nose normal. No rhinorrhea.      Mouth/Throat:      Mouth: Mucous membranes are moist.   Eyes:      General: No scleral icterus.     Conjunctiva/sclera: Conjunctivae normal.   Cardiovascular:      Rate and Rhythm: Normal rate and regular rhythm.   Pulmonary:      Effort: Pulmonary effort is normal. No respiratory distress.   Abdominal:      General: Abdomen is flat. There is no distension.      Palpations: Abdomen is soft.   Musculoskeletal:         General: No deformity.      Right lower leg: No edema.      Left lower leg: No edema.   Skin:     General: Skin  is warm and dry.      Coloration: Skin is not jaundiced.   Neurological:      Mental Status: She is alert and oriented to person, place, and time. Mental status is at baseline.      Cranial Nerves: No cranial nerve deficit.      Gait: Gait normal.   Psychiatric:         Mood and Affect: Mood normal.         Behavior: Behavior normal.         Assessment & Plan:   1. Type 2 diabetes mellitus with other circulatory complication, without long-term current use of insulin  Assessment & Plan:  Most recent A1c was 5.8, goal A1c <7.0%   Continue Mounjaro  Continue ARB and Statin   Continue ADA diet, Counseled on importance of diet & weight loss       Orders:  -     POCT HEMOGLOBIN A1C  -     Cancel: Microalbumin/Creatinine Ratio, Urine  -     CBC Auto Differential; Future; Expected date: 07/15/2024  -     Comprehensive Metabolic Panel; Future; Expected date: 07/15/2024  -     Urinalysis; Future; Expected date: 07/15/2024  -     TSH; Future; Expected date: 07/15/2024  -     Lipid Panel; Future; Expected date: 07/15/2024  -     Hemoglobin A1C; Future; Expected date: 07/15/2024  -     HIV 1/2 Ag/Ab (4th Gen); Future; Expected date: 07/15/2024  -     Microalbumin/Creatinine Ratio, Urine; Future; Expected date: 07/15/2024    2. Gastroesophageal reflux disease, unspecified whether esophagitis present  Assessment & Plan:  Worsening  Restart Protonix  Encouraged lifestyle modifications (sit upright for >30 mins after meals, do not eat <2hrs before bedtime)   Keep food diary, avoid food triggers (such as alcohol, fatty, spicy, red sauce)       Orders:  -     pantoprazole (PROTONIX) 20 MG tablet; Take 2 tablets (40 mg total) by mouth once daily.  Dispense: 180 tablet; Refill: 3  -     CBC Auto Differential; Future; Expected date: 07/15/2024  -     Comprehensive Metabolic Panel; Future; Expected date: 07/15/2024  -     Urinalysis; Future; Expected date: 07/15/2024  -     TSH; Future; Expected date: 07/15/2024  -     Lipid Panel;  Future; Expected date: 07/15/2024  -     Hemoglobin A1C; Future; Expected date: 07/15/2024  -     HIV 1/2 Ag/Ab (4th Gen); Future; Expected date: 07/15/2024  -     Microalbumin/Creatinine Ratio, Urine; Future; Expected date: 07/15/2024    3. Class 1 obesity with serious comorbidity and body mass index (BMI) of 34.0 to 34.9 in adult, unspecified obesity type  Assessment & Plan:  Encouraged healthy lifestyle/diet modifications   Exercise 3-5x a week (>30 minutes, including a variety of cardio, weightbearing and lifting exercises)   Eat a well balanced diet comprised of fruit/vegetables, lean protein, and complex carbs  Continue Mounjaro weekly injection for both glycemic/weight control      Orders:  -     CBC Auto Differential; Future; Expected date: 07/15/2024  -     Comprehensive Metabolic Panel; Future; Expected date: 07/15/2024  -     Urinalysis; Future; Expected date: 07/15/2024  -     TSH; Future; Expected date: 07/15/2024  -     Lipid Panel; Future; Expected date: 07/15/2024  -     Hemoglobin A1C; Future; Expected date: 07/15/2024  -     HIV 1/2 Ag/Ab (4th Gen); Future; Expected date: 07/15/2024  -     Microalbumin/Creatinine Ratio, Urine; Future; Expected date: 07/15/2024    4. Mixed hyperlipidemia  Assessment & Plan:  Last Lipid Panel WNL  ASCVD calculated 10 year risk -moderate to high   Continue Atorvastatin to 80mg daily   Repeat lipid panel for upcoming wellness visit  Counseled on dietary modifications  Counseled to exercise 150 minutes weekly as exercise raises HDL and lowers LDL     Orders:  -     CBC Auto Differential; Future; Expected date: 07/15/2024  -     Comprehensive Metabolic Panel; Future; Expected date: 07/15/2024  -     Urinalysis; Future; Expected date: 07/15/2024  -     TSH; Future; Expected date: 07/15/2024  -     Lipid Panel; Future; Expected date: 07/15/2024  -     Hemoglobin A1C; Future; Expected date: 07/15/2024  -     HIV 1/2 Ag/Ab (4th Gen); Future; Expected date: 07/15/2024  -      Microalbumin/Creatinine Ratio, Urine; Future; Expected date: 07/15/2024    5. Primary hypertension  Assessment & Plan:  Today's BP WNL  Continue Coreg, Norvasc, Benicar, and Lasix PRN  Counseled on low sodium diet, exercise, tobacco avoidance, and limiting alcohol intake   Pt. Has home BP cuff, instructed to measure home BP and report abnormal values        Orders:  -     CBC Auto Differential; Future; Expected date: 07/15/2024  -     Comprehensive Metabolic Panel; Future; Expected date: 07/15/2024  -     Urinalysis; Future; Expected date: 07/15/2024  -     TSH; Future; Expected date: 07/15/2024  -     Lipid Panel; Future; Expected date: 07/15/2024  -     Hemoglobin A1C; Future; Expected date: 07/15/2024  -     HIV 1/2 Ag/Ab (4th Gen); Future; Expected date: 07/15/2024  -     Microalbumin/Creatinine Ratio, Urine; Future; Expected date: 07/15/2024    6. ACC/AHA stage C congestive heart failure  Assessment & Plan:  Stable   Euvolemic on today's visit   Non-ischemic Cardiomyopathy, LHC negative in 2021  Echocardiogram in 2021 showed EF of 30-35%  Continue Olmesartan, Coreg, Lasix  Defer management to Cardiology  Weigh self daily - if 2-3lbs in 24hrs or 5lbs in 1 week, take extra dose of lasix     Orders:  -     CBC Auto Differential; Future; Expected date: 07/15/2024  -     Comprehensive Metabolic Panel; Future; Expected date: 07/15/2024  -     Urinalysis; Future; Expected date: 07/15/2024  -     TSH; Future; Expected date: 07/15/2024  -     Lipid Panel; Future; Expected date: 07/15/2024  -     Hemoglobin A1C; Future; Expected date: 07/15/2024  -     HIV 1/2 Ag/Ab (4th Gen); Future; Expected date: 07/15/2024  -     Microalbumin/Creatinine Ratio, Urine; Future; Expected date: 07/15/2024    7. Wellness examination  -     CBC Auto Differential; Future; Expected date: 07/15/2024  -     Comprehensive Metabolic Panel; Future; Expected date: 07/15/2024  -     Urinalysis; Future; Expected date: 07/15/2024  -     TSH; Future;  Expected date: 07/15/2024  -     Lipid Panel; Future; Expected date: 07/15/2024  -     Hemoglobin A1C; Future; Expected date: 07/15/2024  -     HIV 1/2 Ag/Ab (4th Gen); Future; Expected date: 07/15/2024  -     Microalbumin/Creatinine Ratio, Urine; Future; Expected date: 07/15/2024    8. Encounter for screening mammogram for breast cancer  -     Mammo Digital Screening Bilat w/ Dre; Future; Expected date: 06/13/2024      Follow up in about 3 months (around 7/15/2024) for Wellness, Diabetes, Obesity. In addition to their scheduled follow up, the patient has also been instructed to follow up on as needed basis.

## 2024-04-24 ENCOUNTER — TELEPHONE (OUTPATIENT)
Dept: PRIMARY CARE CLINIC | Facility: CLINIC | Age: 54
End: 2024-04-24
Payer: COMMERCIAL

## 2024-04-24 DIAGNOSIS — E11.43 TYPE II DIABETES MELLITUS WITH PERIPHERAL AUTONOMIC NEUROPATHY: Chronic | ICD-10-CM

## 2024-04-24 NOTE — TELEPHONE ENCOUNTER
----- Message from Marie Brambila MD sent at 4/24/2024  8:51 AM CDT -----  Sent in 10mg of Mounjaro.     Marie Brambila MD  ----- Message -----  From: Bharat Starr MA  Sent: 4/24/2024   8:20 AM CDT  To: Marie Brambila MD    Spoke to patient   Reports she has been on 7.5 of tirzepatide   Since January  Patient is requesting the next dose up since  7.5mg is on back order.  ----- Message -----  From: Natalie Butler  Sent: 4/23/2024   1:11 PM CDT  To: [facility] Sal[facility] Staff    .Type:  Needs Medical Advice    Who Called: pt  Symptoms (please be specific):    How long has patient had these symptoms:    Pharmacy name and phone #:  [facility] #[id number] - [place], [place] - [address] AT [address]      Would the patient rather a call back or a response via [facility]? Call back   Best Call Back Number: [phone]  Additional Information: pt  markus to speak with the nurse about her tirzepatide 7.5 mg/0.5 mL PnIj but the pharmacy has 10 mg would need a new script

## 2024-04-24 NOTE — TELEPHONE ENCOUNTER
----- Message from Bharat Riley MA sent at 4/24/2024  8:18 AM CDT -----  Spoke to patient   Reports she has been on 7.5 of tirzepatide   Since January  Patient is requesting the next dose up since  7.5mg is on back order.  ----- Message -----  From: Natalie Butler  Sent: 4/23/2024   1:11 PM CDT  To: [facility] Sal[facility] Staff    .Type:  Needs Medical Advice    Who Called: pt  Symptoms (please be specific):    How long has patient had these symptoms:    Pharmacy name and phone #:  [facility] #[id number] - [place], [place] - [address] AT [address] & [address]      Would the patient rather a call back or a response via [facility]? Call back   Best Call Back Number: [phone]  Additional Information: pt  markus to speak with the nurse about her tirzepatide 7.5 mg/0.5 mL PnIj but the pharmacy has 10 mg would need a new script

## 2024-05-13 PROBLEM — E66.9 CLASS 1 OBESITY WITH SERIOUS COMORBIDITY AND BODY MASS INDEX (BMI) OF 34.0 TO 34.9 IN ADULT: Chronic | Status: ACTIVE | Noted: 2022-10-10

## 2024-05-13 PROBLEM — Z00.00 WELLNESS EXAMINATION: Chronic | Status: ACTIVE | Noted: 2024-05-13

## 2024-05-13 PROBLEM — E66.811 CLASS 1 OBESITY WITH SERIOUS COMORBIDITY AND BODY MASS INDEX (BMI) OF 34.0 TO 34.9 IN ADULT: Chronic | Status: ACTIVE | Noted: 2022-10-10

## 2024-05-13 PROBLEM — Z00.00 WELLNESS EXAMINATION: Status: ACTIVE | Noted: 2024-05-13

## 2024-05-13 NOTE — ASSESSMENT & PLAN NOTE
Most recent A1c was 5.8, goal A1c <7.0%   Continue Mounjaro  Continue ARB and Statin   Continue ADA diet, Counseled on importance of diet & weight loss

## 2024-05-13 NOTE — ASSESSMENT & PLAN NOTE
Encouraged healthy lifestyle/diet modifications   Exercise 3-5x a week (>30 minutes, including a variety of cardio, weightbearing and lifting exercises)   Eat a well balanced diet comprised of fruit/vegetables, lean protein, and complex carbs  Continue Mounjaro weekly injection for both glycemic/weight control

## 2024-05-13 NOTE — ASSESSMENT & PLAN NOTE
Today's BP WNL  Continue Coreg, Norvasc, Benicar, and Lasix PRN  Counseled on low sodium diet, exercise, tobacco avoidance, and limiting alcohol intake   Pt. Has home BP cuff, instructed to measure home BP and report abnormal values

## 2024-05-13 NOTE — ASSESSMENT & PLAN NOTE
Worsening  Restart Protonix  Encouraged lifestyle modifications (sit upright for >30 mins after meals, do not eat <2hrs before bedtime)   Keep food diary, avoid food triggers (such as alcohol, fatty, spicy, red sauce)

## 2024-05-13 NOTE — ASSESSMENT & PLAN NOTE
Last Lipid Panel WNL  ASCVD calculated 10 year risk -moderate to high   Continue Atorvastatin to 80mg daily   Repeat lipid panel for upcoming wellness visit  Counseled on dietary modifications  Counseled to exercise 150 minutes weekly as exercise raises HDL and lowers LDL

## 2024-05-20 ENCOUNTER — TELEPHONE (OUTPATIENT)
Dept: PRIMARY CARE CLINIC | Facility: CLINIC | Age: 54
End: 2024-05-20
Payer: COMMERCIAL

## 2024-05-20 DIAGNOSIS — E11.43 TYPE II DIABETES MELLITUS WITH PERIPHERAL AUTONOMIC NEUROPATHY: Primary | Chronic | ICD-10-CM

## 2024-05-20 NOTE — TELEPHONE ENCOUNTER
----- Message from Ermelinda Tesfaye sent at 5/20/2024 11:54 AM CDT -----  .Who Called: Kirstie Christianson    Caller is requesting assistance/information from provider's office.        Preferred Method of Contact: Phone Call  Patient's Preferred Phone Number on File: [phone]   Best Call Back Number, if different:  Additional Information: Patient stated that the pharmacy is completely out of the tirzepatide 10 mg/0.5 mL PnIj and is recommending her dosage be increased to 12 mg. Patient is concerned she will get sick again and would like to speak with the nurse to discuss her options. Please call to advise.

## 2024-06-28 ENCOUNTER — PATIENT OUTREACH (OUTPATIENT)
Facility: CLINIC | Age: 54
End: 2024-06-28
Payer: COMMERCIAL

## 2024-07-22 ENCOUNTER — PATIENT OUTREACH (OUTPATIENT)
Facility: CLINIC | Age: 54
End: 2024-07-22
Payer: COMMERCIAL

## 2024-07-22 NOTE — PROGRESS NOTES
Population Health. Out Reach. Reviewing patient's chart for quality metrics.I attempt pt outreach re: , no answer.  due letter mailed to pt. Pt due for mammogram, dm eye and dm foot exam.  
ambulatory

## 2024-07-22 NOTE — LETTER
Dear Ms. Kirstie Plasenciabriana is committed to your overall health. Periodically we review the health information in your chart to make sure you are up to date on all of your recommended tests and/or procedures.       Our review of your chart shows that you may be due for     Health Maintenance Due   Topic    Diabetes Urine Screening     Lipid Panel     Mammogram     Eye Exam        If you have had any of the above done at another facility, please let us know and we will request a copy of the report to update your [facility] record.       At your convenience I would like to speak to you to help get these items scheduled (if needed) and also see if there is anything else we can do to help you. Please send me a message via your patient portal or give me a call at [phone].  I am looking forward to speaking with you soon.   You can also bring this letter, as a reminder, to review your overdue health screenings with your primary care physician at your next clinic appointment.    Sincerely,      DANYELLE Adams Care Coordinator  Marie Brambila MD and your [facility] Primary [facility] Team     Private Auto Walk in

## 2024-08-07 ENCOUNTER — TELEPHONE (OUTPATIENT)
Dept: PRIMARY CARE CLINIC | Facility: CLINIC | Age: 54
End: 2024-08-07
Payer: COMMERCIAL

## 2024-08-12 ENCOUNTER — LAB VISIT (OUTPATIENT)
Dept: LAB | Facility: HOSPITAL | Age: 54
End: 2024-08-12
Attending: STUDENT IN AN ORGANIZED HEALTH CARE EDUCATION/TRAINING PROGRAM
Payer: COMMERCIAL

## 2024-08-12 DIAGNOSIS — E11.59 TYPE 2 DIABETES MELLITUS WITH OTHER CIRCULATORY COMPLICATION, WITHOUT LONG-TERM CURRENT USE OF INSULIN: ICD-10-CM

## 2024-08-12 DIAGNOSIS — I10 PRIMARY HYPERTENSION: ICD-10-CM

## 2024-08-12 DIAGNOSIS — I50.9 ACC/AHA STAGE C CONGESTIVE HEART FAILURE: ICD-10-CM

## 2024-08-12 DIAGNOSIS — E66.9 CLASS 1 OBESITY WITH SERIOUS COMORBIDITY AND BODY MASS INDEX (BMI) OF 34.0 TO 34.9 IN ADULT, UNSPECIFIED OBESITY TYPE: ICD-10-CM

## 2024-08-12 DIAGNOSIS — Z00.00 WELLNESS EXAMINATION: ICD-10-CM

## 2024-08-12 DIAGNOSIS — E78.2 MIXED HYPERLIPIDEMIA: ICD-10-CM

## 2024-08-12 DIAGNOSIS — K21.9 GASTROESOPHAGEAL REFLUX DISEASE, UNSPECIFIED WHETHER ESOPHAGITIS PRESENT: ICD-10-CM

## 2024-08-12 LAB
ALBUMIN SERPL-MCNC: 3.9 G/DL (ref 3.5–5)
ALBUMIN/GLOB SERPL: 1.1 RATIO (ref 1.1–2)
ALP SERPL-CCNC: 100 UNIT/L (ref 40–150)
ALT SERPL-CCNC: 19 UNIT/L (ref 0–55)
ANION GAP SERPL CALC-SCNC: 8 MEQ/L
AST SERPL-CCNC: 16 UNIT/L (ref 5–34)
BACTERIA #/AREA URNS AUTO: ABNORMAL /HPF
BASOPHILS # BLD AUTO: 0.06 X10(3)/MCL
BASOPHILS NFR BLD AUTO: 0.7 %
BILIRUB SERPL-MCNC: 1.3 MG/DL
BILIRUB UR QL STRIP.AUTO: NEGATIVE
BUN SERPL-MCNC: 16.1 MG/DL (ref 9.8–20.1)
CALCIUM SERPL-MCNC: 9.8 MG/DL (ref 8.4–10.2)
CHLORIDE SERPL-SCNC: 108 MMOL/L (ref 98–107)
CHOLEST SERPL-MCNC: 191 MG/DL
CHOLEST/HDLC SERPL: 5 {RATIO} (ref 0–5)
CLARITY UR: CLEAR
CO2 SERPL-SCNC: 25 MMOL/L (ref 22–29)
COLOR UR AUTO: COLORLESS
CREAT SERPL-MCNC: 1.09 MG/DL (ref 0.55–1.02)
CREAT UR-MCNC: 29.4 MG/DL (ref 45–106)
CREAT/UREA NIT SERPL: 15
EOSINOPHIL # BLD AUTO: 0.29 X10(3)/MCL (ref 0–0.9)
EOSINOPHIL NFR BLD AUTO: 3.6 %
ERYTHROCYTE [DISTWIDTH] IN BLOOD BY AUTOMATED COUNT: 13.8 % (ref 11.5–17)
EST. AVERAGE GLUCOSE BLD GHB EST-MCNC: 114 MG/DL
GFR SERPLBLD CREATININE-BSD FMLA CKD-EPI: 60 ML/MIN/1.73/M2
GLOBULIN SER-MCNC: 3.6 GM/DL (ref 2.4–3.5)
GLUCOSE SERPL-MCNC: 123 MG/DL (ref 74–100)
GLUCOSE UR QL STRIP: NORMAL
HBA1C MFR BLD: 5.6 %
HCT VFR BLD AUTO: 46.7 % (ref 37–47)
HDLC SERPL-MCNC: 41 MG/DL (ref 35–60)
HGB BLD-MCNC: 14.7 G/DL (ref 12–16)
HGB UR QL STRIP: NEGATIVE
HIV 1+2 AB+HIV1 P24 AG SERPL QL IA: NONREACTIVE
HYALINE CASTS #/AREA URNS LPF: ABNORMAL /LPF
IMM GRANULOCYTES # BLD AUTO: 0.02 X10(3)/MCL (ref 0–0.04)
IMM GRANULOCYTES NFR BLD AUTO: 0.2 %
KETONES UR QL STRIP: NEGATIVE
LDLC SERPL CALC-MCNC: 119 MG/DL (ref 50–140)
LEUKOCYTE ESTERASE UR QL STRIP: 75
LYMPHOCYTES # BLD AUTO: 2.11 X10(3)/MCL (ref 0.6–4.6)
LYMPHOCYTES NFR BLD AUTO: 26 %
MCH RBC QN AUTO: 30.1 PG (ref 27–31)
MCHC RBC AUTO-ENTMCNC: 31.5 G/DL (ref 33–36)
MCV RBC AUTO: 95.5 FL (ref 80–94)
MICROALBUMIN UR-MCNC: <5 UG/ML
MICROALBUMIN/CREAT RATIO PNL UR: ABNORMAL
MONOCYTES # BLD AUTO: 0.42 X10(3)/MCL (ref 0.1–1.3)
MONOCYTES NFR BLD AUTO: 5.2 %
NEUTROPHILS # BLD AUTO: 5.23 X10(3)/MCL (ref 2.1–9.2)
NEUTROPHILS NFR BLD AUTO: 64.3 %
NITRITE UR QL STRIP: NEGATIVE
NRBC BLD AUTO-RTO: 0 %
PH UR STRIP: 5 [PH]
PLATELET # BLD AUTO: 266 X10(3)/MCL (ref 130–400)
PLATELETS.RETICULATED NFR BLD AUTO: 2.5 % (ref 0.9–11.2)
PMV BLD AUTO: 10.2 FL (ref 7.4–10.4)
POTASSIUM SERPL-SCNC: 4.3 MMOL/L (ref 3.5–5.1)
PROT SERPL-MCNC: 7.5 GM/DL (ref 6.4–8.3)
PROT UR QL STRIP: NEGATIVE
RBC # BLD AUTO: 4.89 X10(6)/MCL (ref 4.2–5.4)
RBC #/AREA URNS AUTO: ABNORMAL /HPF
SODIUM SERPL-SCNC: 141 MMOL/L (ref 136–145)
SP GR UR STRIP.AUTO: 1.01 (ref 1–1.03)
SQUAMOUS #/AREA URNS LPF: ABNORMAL /HPF
TRIGL SERPL-MCNC: 155 MG/DL (ref 37–140)
TSH SERPL-ACNC: 2.41 UIU/ML (ref 0.35–4.94)
UROBILINOGEN UR STRIP-ACNC: NORMAL
VLDLC SERPL CALC-MCNC: 31 MG/DL
WBC # BLD AUTO: 8.13 X10(3)/MCL (ref 4.5–11.5)
WBC #/AREA URNS AUTO: ABNORMAL /HPF

## 2024-08-12 PROCEDURE — 87389 HIV-1 AG W/HIV-1&-2 AB AG IA: CPT

## 2024-08-12 PROCEDURE — 36415 COLL VENOUS BLD VENIPUNCTURE: CPT

## 2024-08-12 PROCEDURE — 83036 HEMOGLOBIN GLYCOSYLATED A1C: CPT

## 2024-08-12 PROCEDURE — 80053 COMPREHEN METABOLIC PANEL: CPT

## 2024-08-12 PROCEDURE — 81001 URINALYSIS AUTO W/SCOPE: CPT

## 2024-08-12 PROCEDURE — 80061 LIPID PANEL: CPT

## 2024-08-12 PROCEDURE — 82570 ASSAY OF URINE CREATININE: CPT

## 2024-08-12 PROCEDURE — 84443 ASSAY THYROID STIM HORMONE: CPT

## 2024-08-12 PROCEDURE — 85025 COMPLETE CBC W/AUTO DIFF WBC: CPT

## 2024-08-14 ENCOUNTER — CLINICAL SUPPORT (OUTPATIENT)
Dept: PRIMARY CARE CLINIC | Facility: CLINIC | Age: 54
End: 2024-08-14
Attending: STUDENT IN AN ORGANIZED HEALTH CARE EDUCATION/TRAINING PROGRAM
Payer: COMMERCIAL

## 2024-08-14 ENCOUNTER — OFFICE VISIT (OUTPATIENT)
Dept: PRIMARY CARE CLINIC | Facility: CLINIC | Age: 54
End: 2024-08-14
Payer: COMMERCIAL

## 2024-08-14 VITALS
SYSTOLIC BLOOD PRESSURE: 122 MMHG | RESPIRATION RATE: 20 BRPM | DIASTOLIC BLOOD PRESSURE: 79 MMHG | TEMPERATURE: 98 F | BODY MASS INDEX: 33.38 KG/M2 | OXYGEN SATURATION: 99 % | WEIGHT: 170 LBS | HEART RATE: 87 BPM | HEIGHT: 60 IN

## 2024-08-14 DIAGNOSIS — I50.9 ACC/AHA STAGE C CONGESTIVE HEART FAILURE: Chronic | ICD-10-CM

## 2024-08-14 DIAGNOSIS — R51.9 CHRONIC NONINTRACTABLE HEADACHE, UNSPECIFIED HEADACHE TYPE: Chronic | ICD-10-CM

## 2024-08-14 DIAGNOSIS — Z01.818 PRE-OPERATIVE CLEARANCE: ICD-10-CM

## 2024-08-14 DIAGNOSIS — E11.59 TYPE 2 DIABETES MELLITUS WITH OTHER CIRCULATORY COMPLICATION, WITHOUT LONG-TERM CURRENT USE OF INSULIN: Chronic | ICD-10-CM

## 2024-08-14 DIAGNOSIS — Z00.00 WELLNESS EXAMINATION: Primary | ICD-10-CM

## 2024-08-14 DIAGNOSIS — M54.16 LUMBAR BACK PAIN WITH RADICULOPATHY AFFECTING LOWER EXTREMITY: Chronic | ICD-10-CM

## 2024-08-14 DIAGNOSIS — G89.29 CHRONIC NONINTRACTABLE HEADACHE, UNSPECIFIED HEADACHE TYPE: Chronic | ICD-10-CM

## 2024-08-14 DIAGNOSIS — I10 PRIMARY HYPERTENSION: Chronic | ICD-10-CM

## 2024-08-14 DIAGNOSIS — M54.12 CERVICAL RADICULOPATHY: ICD-10-CM

## 2024-08-14 PROCEDURE — 3066F NEPHROPATHY DOC TX: CPT | Mod: CPTII,,, | Performed by: STUDENT IN AN ORGANIZED HEALTH CARE EDUCATION/TRAINING PROGRAM

## 2024-08-14 PROCEDURE — 3074F SYST BP LT 130 MM HG: CPT | Mod: CPTII,,, | Performed by: STUDENT IN AN ORGANIZED HEALTH CARE EDUCATION/TRAINING PROGRAM

## 2024-08-14 PROCEDURE — 92228 IMG RTA DETC/MNTR DS PHY/QHP: CPT | Mod: TC,,, | Performed by: STUDENT IN AN ORGANIZED HEALTH CARE EDUCATION/TRAINING PROGRAM

## 2024-08-14 PROCEDURE — 99396 PREV VISIT EST AGE 40-64: CPT | Mod: ,,, | Performed by: STUDENT IN AN ORGANIZED HEALTH CARE EDUCATION/TRAINING PROGRAM

## 2024-08-14 PROCEDURE — 92228 IMG RTA DETC/MNTR DS PHY/QHP: CPT | Mod: ,,, | Performed by: OPTOMETRIST

## 2024-08-14 PROCEDURE — 1160F RVW MEDS BY RX/DR IN RCRD: CPT | Mod: CPTII,,, | Performed by: STUDENT IN AN ORGANIZED HEALTH CARE EDUCATION/TRAINING PROGRAM

## 2024-08-14 PROCEDURE — 2025F 7 FLD RTA PHOTO W/O RTNOPTHY: CPT | Mod: CPTII,,, | Performed by: OPTOMETRIST

## 2024-08-14 PROCEDURE — 3078F DIAST BP <80 MM HG: CPT | Mod: CPTII,,, | Performed by: STUDENT IN AN ORGANIZED HEALTH CARE EDUCATION/TRAINING PROGRAM

## 2024-08-14 PROCEDURE — 3008F BODY MASS INDEX DOCD: CPT | Mod: CPTII,,, | Performed by: STUDENT IN AN ORGANIZED HEALTH CARE EDUCATION/TRAINING PROGRAM

## 2024-08-14 PROCEDURE — 1159F MED LIST DOCD IN RCRD: CPT | Mod: CPTII,,, | Performed by: STUDENT IN AN ORGANIZED HEALTH CARE EDUCATION/TRAINING PROGRAM

## 2024-08-14 PROCEDURE — 3044F HG A1C LEVEL LT 7.0%: CPT | Mod: CPTII,,, | Performed by: STUDENT IN AN ORGANIZED HEALTH CARE EDUCATION/TRAINING PROGRAM

## 2024-08-14 PROCEDURE — 3061F NEG MICROALBUMINURIA REV: CPT | Mod: CPTII,,, | Performed by: STUDENT IN AN ORGANIZED HEALTH CARE EDUCATION/TRAINING PROGRAM

## 2024-08-14 RX ORDER — BACLOFEN 10 MG/1
10 TABLET ORAL 3 TIMES DAILY PRN
Qty: 30 TABLET | Refills: 3 | Status: SHIPPED | OUTPATIENT
Start: 2024-08-14

## 2024-08-14 NOTE — PROGRESS NOTES
Kirstie Christianson    For exam results, see Encounter Report.Kirstie Christianson is a 53 y.o. female here for a diabetic eye screening with non-dilated fundus photos per .    Patient cooperative?: Yes  Small pupils?: No  Last eye exam: 1 YEAR    For exam results, see Encounter Report.

## 2024-08-14 NOTE — PROGRESS NOTES
ANNUAL WELLNESS NOTE    Chief Complaint:  Annual Exam and Migraine (Left side head travels to top pf head)     HPI:  Kirstie Christianson is a 53 y.o. female    -------------------------------------    CHF (NYHA class III, ACC/AHA Stage C)    Diabetes Mellitus II    Disorder of kidney and ureter    Hilar lymphadenopathy    Obesity, unspecified    Pulmonary nodule    Systolic heart failure     Patient Care Team:  Marie Brambila MD as PCP - General (Internal Medicine)  Ilir Herrera MD as Consulting Physician (Pulmonary Disease)  Lor Benjamin, RN as Diabetes Educator (Diabetes)  Nasreen Schulz MD as Consulting Physician (Obstetrics and Gynecology)  Angie Nash LPN as Care Coordinator    Presents today for wellness visit. Describes overall health as good. Diet is not healthy - work in progress. Exercises rarely. Tobacco use: never. Alcohol use: rare (special occasion). Caffeine intake: 1 caffeinated drink daily. Eye exam: overdue. Dental exam: overdue. Reviewed labs, answered all questions and concerns at this time. Need surgical pre-op clearance for OBGYN surgery. Still struggling to get Mounjaro, on a lower dose, states that pharmacy is still saying it is out of stock. Would like to transfer to [facility] pharmacy for all medications from now on. Endorsed headaches, and chronic lower back pain.     Review of Systems   Constitutional:  Negative for chills and fever.   Respiratory:  Negative for cough and shortness of breath.    Cardiovascular:  Negative for chest pain.   Gastrointestinal:  Negative for abdominal pain, nausea and vomiting.   Genitourinary:  Negative for dysuria and hematuria.   Musculoskeletal:  Positive for arthralgias and back pain.   Neurological:  Positive for headaches.     Physical Exam  Vitals and nursing note reviewed.   Constitutional:       General: She is not in acute distress.     Appearance: Normal appearance. She is not ill-appearing.   HENT:      Nose: No rhinorrhea.       Mouth/Throat:      Mouth: Mucous membranes are moist.   Eyes:      General: No scleral icterus.     Conjunctiva/sclera: Conjunctivae normal.   Cardiovascular:      Rate and Rhythm: Normal rate and regular rhythm.      Pulses: Normal pulses.           Dorsalis pedis pulses are 2+ on the right side and 2+ on the left side.        Posterior tibial pulses are 2+ on the right side and 2+ on the left side.      Heart sounds: Normal heart sounds. No murmur heard.  Pulmonary:      Effort: Pulmonary effort is normal. No respiratory distress.      Breath sounds: Normal breath sounds. No wheezing.   Abdominal:      Palpations: Abdomen is soft.      Tenderness: There is no abdominal tenderness.   Musculoskeletal:      Right lower leg: No edema.      Left lower leg: No edema.      Right foot: No deformity.      Left foot: No deformity.   Feet:      Right foot:      Protective Sensation: 5 sites tested.  5 sites sensed.      Skin integrity: No ulcer, blister, skin breakdown, erythema, warmth, callus, dry skin or fissure.      Left foot:      Protective Sensation: 5 sites tested.  5 sites sensed.      Skin integrity: No ulcer, blister, skin breakdown, erythema, warmth, callus, dry skin or fissure.   Skin:     General: Skin is warm.      Coloration: Skin is not jaundiced.   Neurological:      Mental Status: She is alert. Mental status is at baseline.      Gait: Gait normal.   Psychiatric:         Mood and Affect: Mood normal.         Behavior: Behavior normal.       Assessment/Plan:  1. Wellness examination  Assessment & Plan:  Health Maintenance:  Routine Health Maintenance   Exercise as tolerated, >30 minutes a day for 3-5 days a week.  Counseled patient on compliance with medications and healthy diet.     Age-Appropriate Screening  Vaccinations:    - Flu: Season Ended   - Pneumonia: Postponed, patient's preference   - Shingles (>50): Postponed, patient's preference    - Tdap: Postponed, patient's preference   - COVID:  Postponed, patient's preference    Screening:   - Pap Smear (21-65): UTD, due 2028   - Mammogram (40-50 discuss w/ pt, all >50): UTD, due in June, ordered   - Osteoporosis (all>65, sooner if risk factors): N/A   - Lung Ca. Screening (50-80 if >20 pack yrs current or quit <15 yrs): N/A   - Colon Ca. Screening (age >45): UTD, every 3 years, shirard, due 2026   - Hep. C, HIV: Negative       2. Pre-operative clearance  Comments:  Chronic Conditions optimized, stable for surgery   Recommended cardiac clearance given history of HF - evolumeic at this time, GDMT; patient will reach out to c    3. Type 2 diabetes mellitus with other circulatory complication, without long-term current use of insulin  Assessment & Plan:  Most recent A1c was 5.6, goal A1c <7.0%   Continue Mounjaro  Continue ARB and Statin   Continue ADA diet, Counseled on importance of diet & weight loss       Orders:  -     Diabetic Eye Screening Photo; Future    4. Primary hypertension  Assessment & Plan:  Today's BP WNL  Continue Coreg, Norvasc, Benicar, and Lasix PRN  Counseled on low sodium diet, exercise, tobacco avoidance, and limiting alcohol intake   Pt. Has home BP cuff, instructed to measure home BP and report abnormal values          5. Chronic nonintractable headache, unspecified headache type  -     baclofen (LIORESAL) 10 MG tablet; Take 1 tablet (10 mg total) by mouth 3 (three) times daily as needed (neck/back pain, tension headache).  Dispense: 30 tablet; Refill: 3    6. Cervical radiculopathy  Assessment & Plan:  Worsening  Physical therapy nor conservative pain management has worked.   XR showed degenerative disease. MRI and referall to [facility] was placed at last visit, unable to do at this time due to financial reasons.   Mobic as needed   Start Baclofen for spasms, may be contributing to headaches (tension)   Lyrica, gabapentin didn't work     Orders:  -     baclofen (LIORESAL) 10 MG tablet; Take 1 tablet (10 mg total) by mouth 3 (three)  times daily as needed (neck/back pain, tension headache).  Dispense: 30 tablet; Refill: 3  -     pregabalin (LYRICA) 150 MG capsule; Take 1 capsule (150 mg total) by mouth 2 (two) times daily.  Dispense: 60 capsule; Refill: 6    7. ACC/AHA stage C congestive heart failure  Assessment & Plan:  Stable   Euvolemic on today's visit   Non-ischemic Cardiomyopathy, LHC negative in 2021  Echocardiogram in 2021 showed EF of 30-35%; improved to 55% echocardiogram 2022  Continue Olmesartan, Coreg, Lasix  Defer management to Cardiology  Weigh self daily - if 2-3lbs in 24hrs or 5lbs in 1 week, take extra dose of lasix   Reach out for cardiology clearance prior to surgery, voiced understanding and agreed with plan      8. Lumbar back pain with radiculopathy affecting lower extremity  Assessment & Plan:  Stable, episodic flares  Lyrica, gabapentin didn't work  X-ray of the lumbar spine showed degenerative changes, failed conservative therapy and physical therapy completed multiple weeks without significant improvement   Ordered MRI and referred to Neurosurgery for further possible intervention - unable to do due to financial reasons at this time   Continue to tylenol as needed, Voltaren gel as needed      Orders:  -     pregabalin (LYRICA) 150 MG capsule; Take 1 capsule (150 mg total) by mouth 2 (two) times daily.  Dispense: 60 capsule; Refill: 6    Follow up in about 3 months (around 11/14/2024) for Diabetes, HTN, Medical Management.

## 2024-08-15 PROCEDURE — 96372 THER/PROPH/DIAG INJ SC/IM: CPT | Mod: ,,, | Performed by: STUDENT IN AN ORGANIZED HEALTH CARE EDUCATION/TRAINING PROGRAM

## 2024-08-15 RX ADMIN — DEXAMETHASONE SODIUM PHOSPHATE 4 MG: 4 INJECTION, SOLUTION INTRA-ARTICULAR; INTRALESIONAL; INTRAMUSCULAR; INTRAVENOUS; SOFT TISSUE at 11:08

## 2024-08-21 ENCOUNTER — TELEPHONE (OUTPATIENT)
Dept: PRIMARY CARE CLINIC | Facility: CLINIC | Age: 54
End: 2024-08-21
Payer: COMMERCIAL

## 2024-08-23 ENCOUNTER — TELEPHONE (OUTPATIENT)
Dept: PRIMARY CARE CLINIC | Facility: CLINIC | Age: 54
End: 2024-08-23
Payer: COMMERCIAL

## 2024-08-23 NOTE — TELEPHONE ENCOUNTER
----- Message from Sunni Moore sent at 8/23/2024  9:39 AM CDT -----  Who Called: Kirstie Christianson    Caller is requesting assistance/information from provider's office.  Preferred Method of Contact: Phone Call  Patient's Preferred Phone Number on File: [phone]   Best Call Back Number, if different:  Additional Information: medical advice, Yara jaquez/Dr Schulz [facility] [phone] calling to confirm fax received for pt surgery clearance, please advise, thanks

## 2024-09-02 DIAGNOSIS — E11.43 TYPE II DIABETES MELLITUS WITH PERIPHERAL AUTONOMIC NEUROPATHY: Chronic | ICD-10-CM

## 2024-09-02 PROBLEM — R51.9 CHRONIC NONINTRACTABLE HEADACHE: Chronic | Status: ACTIVE | Noted: 2024-08-14

## 2024-09-02 PROBLEM — G89.29 CHRONIC NONINTRACTABLE HEADACHE: Chronic | Status: ACTIVE | Noted: 2024-08-14

## 2024-09-02 RX ORDER — PREGABALIN 150 MG/1
150 CAPSULE ORAL 2 TIMES DAILY
Qty: 60 CAPSULE | Refills: 6 | Status: SHIPPED | OUTPATIENT
Start: 2024-09-02 | End: 2025-03-03

## 2024-09-02 NOTE — ASSESSMENT & PLAN NOTE
Worsening  Physical therapy nor conservative pain management has worked.   XR showed degenerative disease. MRI and referall to [facility] was placed at last visit, unable to do at this time due to financial reasons.   Mobic as needed   Start Baclofen for spasms, may be contributing to headaches (tension)   Lyrica, gabapentin didn't work

## 2024-09-02 NOTE — ASSESSMENT & PLAN NOTE
Most recent A1c was 5.6, goal A1c <7.0%   Continue Mounjaro  Continue ARB and Statin   Continue ADA diet, Counseled on importance of diet & weight loss

## 2024-09-02 NOTE — ASSESSMENT & PLAN NOTE
Stable, episodic flares  Lyrica, gabapentin didn't work  X-ray of the lumbar spine showed degenerative changes, failed conservative therapy and physical therapy completed multiple weeks without significant improvement   Ordered MRI and referred to Neurosurgery for further possible intervention - unable to do due to financial reasons at this time   Continue to tylenol as needed, Voltaren gel as needed

## 2024-09-02 NOTE — ASSESSMENT & PLAN NOTE
Health Maintenance:  Routine Health Maintenance   Exercise as tolerated, >30 minutes a day for 3-5 days a week.  Counseled patient on compliance with medications and healthy diet.     Age-Appropriate Screening  Vaccinations:    - Flu: Season Ended   - Pneumonia: Postponed, patient's preference   - Shingles (>50): Postponed, patient's preference    - Tdap: Postponed, patient's preference   - COVID: Postponed, patient's preference    Screening:   - Pap Smear (21-65): UTD, due 2028   - Mammogram (40-50 discuss w/ pt, all >50): UTD, due in June, ordered   - Osteoporosis (all>65, sooner if risk factors): N/A   - Lung Ca. Screening (50-80 if >20 pack yrs current or quit <15 yrs): N/A   - Colon Ca. Screening (age >45): UTD, every 3 years, cologuard, due 2026   - Hep. C, HIV: Negative

## 2024-09-03 RX ORDER — TIRZEPATIDE 12.5 MG/.5ML
INJECTION, SOLUTION SUBCUTANEOUS
Qty: 2 ML | Refills: 3 | Status: SHIPPED | OUTPATIENT
Start: 2024-09-03

## 2024-10-28 ENCOUNTER — TELEPHONE (OUTPATIENT)
Dept: PRIMARY CARE CLINIC | Facility: CLINIC | Age: 54
End: 2024-10-28
Payer: COMMERCIAL

## 2024-10-30 ENCOUNTER — LAB VISIT (OUTPATIENT)
Dept: LAB | Facility: HOSPITAL | Age: 54
End: 2024-10-30
Attending: STUDENT IN AN ORGANIZED HEALTH CARE EDUCATION/TRAINING PROGRAM
Payer: COMMERCIAL

## 2024-10-30 ENCOUNTER — TELEPHONE (OUTPATIENT)
Dept: PRIMARY CARE CLINIC | Facility: CLINIC | Age: 54
End: 2024-10-30
Payer: COMMERCIAL

## 2024-10-30 DIAGNOSIS — K21.9 GASTROESOPHAGEAL REFLUX DISEASE, UNSPECIFIED WHETHER ESOPHAGITIS PRESENT: Chronic | ICD-10-CM

## 2024-10-30 DIAGNOSIS — D72.829 LEUKOCYTOSIS, UNSPECIFIED TYPE: ICD-10-CM

## 2024-10-30 DIAGNOSIS — K92.1 MELENA: Primary | ICD-10-CM

## 2024-10-30 DIAGNOSIS — R19.4 CHANGE IN BOWEL HABITS: ICD-10-CM

## 2024-10-30 DIAGNOSIS — K92.1 MELENA: ICD-10-CM

## 2024-10-30 DIAGNOSIS — R10.84 GENERALIZED ABDOMINAL PAIN: Primary | ICD-10-CM

## 2024-10-30 LAB
ALBUMIN SERPL-MCNC: 3.8 G/DL (ref 3.5–5)
ALBUMIN/GLOB SERPL: 1 RATIO (ref 1.1–2)
ALP SERPL-CCNC: 132 UNIT/L (ref 40–150)
ALT SERPL-CCNC: 18 UNIT/L (ref 0–55)
ANION GAP SERPL CALC-SCNC: 7 MEQ/L
AST SERPL-CCNC: 21 UNIT/L (ref 5–34)
BASOPHILS # BLD AUTO: 0.1 X10(3)/MCL
BASOPHILS NFR BLD AUTO: 0.8 %
BILIRUB SERPL-MCNC: 2 MG/DL
BUN SERPL-MCNC: 19.3 MG/DL (ref 9.8–20.1)
CALCIUM SERPL-MCNC: 9.7 MG/DL (ref 8.4–10.2)
CHLORIDE SERPL-SCNC: 110 MMOL/L (ref 98–107)
CO2 SERPL-SCNC: 22 MMOL/L (ref 22–29)
CREAT SERPL-MCNC: 1.27 MG/DL (ref 0.55–1.02)
CREAT/UREA NIT SERPL: 15
EOSINOPHIL # BLD AUTO: 1.13 X10(3)/MCL (ref 0–0.9)
EOSINOPHIL NFR BLD AUTO: 9.4 %
ERYTHROCYTE [DISTWIDTH] IN BLOOD BY AUTOMATED COUNT: 13.5 % (ref 11.5–17)
GFR SERPLBLD CREATININE-BSD FMLA CKD-EPI: 50 ML/MIN/1.73/M2
GLOBULIN SER-MCNC: 3.8 GM/DL (ref 2.4–3.5)
GLUCOSE SERPL-MCNC: 103 MG/DL (ref 74–100)
HCT VFR BLD AUTO: 44.1 % (ref 37–47)
HGB BLD-MCNC: 14.6 G/DL (ref 12–16)
IMM GRANULOCYTES # BLD AUTO: 0.04 X10(3)/MCL (ref 0–0.04)
IMM GRANULOCYTES NFR BLD AUTO: 0.3 %
LYMPHOCYTES # BLD AUTO: 2.17 X10(3)/MCL (ref 0.6–4.6)
LYMPHOCYTES NFR BLD AUTO: 18 %
MCH RBC QN AUTO: 30.3 PG (ref 27–31)
MCHC RBC AUTO-ENTMCNC: 33.1 G/DL (ref 33–36)
MCV RBC AUTO: 91.5 FL (ref 80–94)
MONOCYTES # BLD AUTO: 0.61 X10(3)/MCL (ref 0.1–1.3)
MONOCYTES NFR BLD AUTO: 5.1 %
NEUTROPHILS # BLD AUTO: 7.99 X10(3)/MCL (ref 2.1–9.2)
NEUTROPHILS NFR BLD AUTO: 66.4 %
NRBC BLD AUTO-RTO: 0 %
PLATELET # BLD AUTO: 315 X10(3)/MCL (ref 130–400)
PMV BLD AUTO: 9.9 FL (ref 7.4–10.4)
POTASSIUM SERPL-SCNC: 3.5 MMOL/L (ref 3.5–5.1)
PROT SERPL-MCNC: 7.6 GM/DL (ref 6.4–8.3)
RBC # BLD AUTO: 4.82 X10(6)/MCL (ref 4.2–5.4)
SODIUM SERPL-SCNC: 139 MMOL/L (ref 136–145)
WBC # BLD AUTO: 12.04 X10(3)/MCL (ref 4.5–11.5)

## 2024-10-30 PROCEDURE — 36415 COLL VENOUS BLD VENIPUNCTURE: CPT

## 2024-10-30 PROCEDURE — 80053 COMPREHEN METABOLIC PANEL: CPT

## 2024-10-30 PROCEDURE — 85025 COMPLETE CBC W/AUTO DIFF WBC: CPT

## 2024-10-30 RX ORDER — PANTOPRAZOLE SODIUM 40 MG/1
40 TABLET, DELAYED RELEASE ORAL DAILY
Qty: 90 TABLET | Refills: 3 | Status: SHIPPED | OUTPATIENT
Start: 2024-10-30 | End: 2025-10-30

## 2024-10-31 LAB
COLOR STL: NORMAL
CONSISTENCY STL: NORMAL
HEMOCCULT SP1 STL QL: NEGATIVE

## 2024-10-31 PROCEDURE — 82272 OCCULT BLD FECES 1-3 TESTS: CPT | Performed by: STUDENT IN AN ORGANIZED HEALTH CARE EDUCATION/TRAINING PROGRAM

## 2024-10-31 PROCEDURE — 87177 OVA AND PARASITES SMEARS: CPT | Performed by: STUDENT IN AN ORGANIZED HEALTH CARE EDUCATION/TRAINING PROGRAM

## 2024-10-31 PROCEDURE — 87045 FECES CULTURE AEROBIC BACT: CPT | Performed by: STUDENT IN AN ORGANIZED HEALTH CARE EDUCATION/TRAINING PROGRAM

## 2024-11-01 DIAGNOSIS — R10.84 GENERALIZED ABDOMINAL PAIN: ICD-10-CM

## 2024-11-01 DIAGNOSIS — R19.4 CHANGE IN BOWEL HABITS: ICD-10-CM

## 2024-11-01 DIAGNOSIS — K92.1 MELENA: Primary | ICD-10-CM

## 2024-11-02 LAB — BACTERIA STL CULT: NORMAL

## 2024-11-05 ENCOUNTER — TELEPHONE (OUTPATIENT)
Dept: PRIMARY CARE CLINIC | Facility: CLINIC | Age: 54
End: 2024-11-05
Payer: COMMERCIAL

## 2024-11-05 NOTE — TELEPHONE ENCOUNTER
----- Message from Marie Brambila MD sent at 10/31/2024 11:55 AM CDT -----  Please see if we can add a stool culture and ova/parasite to the sample or if the patient needs to recollect.     Also let the patient know that the stool is negative for blood.     Marie Brambila MD

## 2024-11-05 NOTE — TELEPHONE ENCOUNTER
----- Message from Marie Brambila MD sent at 2024  8:15 AM CDT -----  Please let the patient know the followin. Elevated chloride and creatinine level, signs of dehydration. Limit alcohol, tea, caffeinated drinks such as sodas. Increase water (60 ounces daily at least), recommend adding 1 electrolyte drink daily to help with hydration as well.    2. WBC count, particularly eosinophils were elevated. This is concerning for infectious/parasitic infection. We have added ova and parasites and stool culture to the stool you brought. I ordered a CT scan of the abdomen/pelvis area to evaluate further as well.     Continue the Protonix daily.     Marie Brambila MD       yes

## 2024-11-14 ENCOUNTER — OFFICE VISIT (OUTPATIENT)
Dept: PRIMARY CARE CLINIC | Facility: CLINIC | Age: 54
End: 2024-11-14
Payer: COMMERCIAL

## 2024-11-14 VITALS
RESPIRATION RATE: 15 BRPM | SYSTOLIC BLOOD PRESSURE: 118 MMHG | BODY MASS INDEX: 31.22 KG/M2 | WEIGHT: 159 LBS | DIASTOLIC BLOOD PRESSURE: 82 MMHG | HEART RATE: 85 BPM | TEMPERATURE: 98 F | HEIGHT: 60 IN | OXYGEN SATURATION: 96 %

## 2024-11-14 DIAGNOSIS — E11.43 TYPE II DIABETES MELLITUS WITH PERIPHERAL AUTONOMIC NEUROPATHY: Primary | Chronic | ICD-10-CM

## 2024-11-14 DIAGNOSIS — M26.622 TENDERNESS OF LEFT TEMPOROMANDIBULAR JOINT: ICD-10-CM

## 2024-11-14 DIAGNOSIS — E66.811 CLASS 1 OBESITY WITH SERIOUS COMORBIDITY AND BODY MASS INDEX (BMI) OF 34.0 TO 34.9 IN ADULT, UNSPECIFIED OBESITY TYPE: Chronic | ICD-10-CM

## 2024-11-14 DIAGNOSIS — R79.89 ELEVATED SERUM CREATININE: ICD-10-CM

## 2024-11-14 DIAGNOSIS — D72.829 LEUKOCYTOSIS, UNSPECIFIED TYPE: ICD-10-CM

## 2024-11-14 DIAGNOSIS — I10 PRIMARY HYPERTENSION: Chronic | ICD-10-CM

## 2024-11-14 PROCEDURE — 3066F NEPHROPATHY DOC TX: CPT | Mod: CPTII,,, | Performed by: STUDENT IN AN ORGANIZED HEALTH CARE EDUCATION/TRAINING PROGRAM

## 2024-11-14 PROCEDURE — 99214 OFFICE O/P EST MOD 30 MIN: CPT | Mod: ,,, | Performed by: STUDENT IN AN ORGANIZED HEALTH CARE EDUCATION/TRAINING PROGRAM

## 2024-11-14 PROCEDURE — 1159F MED LIST DOCD IN RCRD: CPT | Mod: CPTII,,, | Performed by: STUDENT IN AN ORGANIZED HEALTH CARE EDUCATION/TRAINING PROGRAM

## 2024-11-14 PROCEDURE — 3008F BODY MASS INDEX DOCD: CPT | Mod: CPTII,,, | Performed by: STUDENT IN AN ORGANIZED HEALTH CARE EDUCATION/TRAINING PROGRAM

## 2024-11-14 PROCEDURE — 3044F HG A1C LEVEL LT 7.0%: CPT | Mod: CPTII,,, | Performed by: STUDENT IN AN ORGANIZED HEALTH CARE EDUCATION/TRAINING PROGRAM

## 2024-11-14 PROCEDURE — 3079F DIAST BP 80-89 MM HG: CPT | Mod: CPTII,,, | Performed by: STUDENT IN AN ORGANIZED HEALTH CARE EDUCATION/TRAINING PROGRAM

## 2024-11-14 PROCEDURE — 3074F SYST BP LT 130 MM HG: CPT | Mod: CPTII,,, | Performed by: STUDENT IN AN ORGANIZED HEALTH CARE EDUCATION/TRAINING PROGRAM

## 2024-11-14 PROCEDURE — 3061F NEG MICROALBUMINURIA REV: CPT | Mod: CPTII,,, | Performed by: STUDENT IN AN ORGANIZED HEALTH CARE EDUCATION/TRAINING PROGRAM

## 2024-11-14 PROCEDURE — 1160F RVW MEDS BY RX/DR IN RCRD: CPT | Mod: CPTII,,, | Performed by: STUDENT IN AN ORGANIZED HEALTH CARE EDUCATION/TRAINING PROGRAM

## 2024-11-14 RX ORDER — TIRZEPATIDE 12.5 MG/.5ML
12.5 INJECTION, SOLUTION SUBCUTANEOUS
COMMUNITY
Start: 2024-09-09 | End: 2024-11-14

## 2024-11-14 NOTE — ASSESSMENT & PLAN NOTE
Most recent A1c was 5.4, goal A1c <7.0%   Continue Mounjaro for better glycemic and weight loss   Continue ACE-I and Statin   Continue ADA diet, Counseled on importance of diet & weight loss

## 2024-11-14 NOTE — PROGRESS NOTES
Subjective:       Patient ID: Kirstie Christianson is a 54 y.o. female.    -------------------------------------    CHF (NYHA class III, ACC/AHA Stage C)    Diabetes Mellitus II    Disorder of kidney and ureter    Hilar lymphadenopathy    Obesity, unspecified    Pulmonary nodule    Systolic heart failure      Chief Complaint: Follow-up and Headache    Presents to the clinic for diabetes follow up. POC A1c was 5.4. Diarrhea is improving, had normal BM today. Would like to increase her Mounjaro to 15 mg weekly injections. Endorsed left sided headaches temporal area, ear pain and jaw tenderness. Denies any nasal congestion, rhinorrhea.       Review of Systems   Constitutional:  Negative for chills and fever.   HENT:  Positive for ear pain.    Respiratory:  Negative for cough and shortness of breath.    Cardiovascular:  Negative for chest pain.   Gastrointestinal:  Negative for abdominal pain, nausea and vomiting.   Genitourinary:  Negative for dysuria and hematuria.   Neurological:  Positive for headaches.         Objective:      Physical Exam  Vitals and nursing note reviewed.   Constitutional:       General: She is not in acute distress.     Appearance: Normal appearance. She is not ill-appearing.   HENT:      Head: Normocephalic.      Comments: Tenderness/swelling at TMJ      Right Ear: Tympanic membrane and external ear normal. No middle ear effusion. Tympanic membrane is not erythematous.      Left Ear: Tympanic membrane and external ear normal.  No middle ear effusion. Tympanic membrane is not erythematous.      Nose: Nose normal. No rhinorrhea.      Mouth/Throat:      Mouth: Mucous membranes are moist.   Eyes:      General: No scleral icterus.     Conjunctiva/sclera: Conjunctivae normal.   Cardiovascular:      Rate and Rhythm: Normal rate and regular rhythm.   Pulmonary:      Effort: Pulmonary effort is normal. No respiratory distress.   Musculoskeletal:         General: No deformity.   Skin:     General: Skin is warm  and dry.      Coloration: Skin is not jaundiced.   Neurological:      Mental Status: She is alert and oriented to person, place, and time. Mental status is at baseline.      Cranial Nerves: No cranial nerve deficit.      Gait: Gait normal.   Psychiatric:         Mood and Affect: Mood normal.         Behavior: Behavior normal.         Assessment & Plan:   1. Type II diabetes mellitus with peripheral autonomic neuropathy  Assessment & Plan:  Most recent A1c was 5.4, goal A1c <7.0%   Continue Mounjaro for better glycemic and weight loss   Continue ACE-I and Statin   Continue ADA diet, Counseled on importance of diet & weight loss   Orders:  -     tirzepatide 15 mg/0.5 mL PnIj; Inject 15 mg into the skin every 7 days.  Dispense: 2 mL; Refill: 11  -     Comprehensive Metabolic Panel; Future; Expected date: 02/14/2025  -     Hemoglobin A1C; Future; Expected date: 02/14/2025  -     POCT HEMOGLOBIN A1C    2. Class 1 obesity with serious comorbidity and body mass index (BMI) of 34.0 to 34.9 in adult, unspecified obesity type  Assessment & Plan:  Encouraged healthy lifestyle/diet modifications   Exercise 3-5x a week (>30 minutes, including a variety of cardio, weightbearing and lifting exercises)   Eat a well balanced diet comprised of fruit/vegetables, lean protein, and complex carbs  Continue Mounjaro weekly injection for both glycemic/weight control  Orders:  -     tirzepatide 15 mg/0.5 mL PnIj; Inject 15 mg into the skin every 7 days.  Dispense: 2 mL; Refill: 11    3. Primary hypertension  Assessment & Plan:  Today's BP WNL  Continue Coreg, Norvasc, Benicar, and Lasix PRN  Counseled on low sodium diet, exercise, tobacco avoidance, and limiting alcohol intake   Pt. Has home BP cuff, instructed to measure home BP and report abnormal values    Orders:  -     Comprehensive Metabolic Panel; Future; Expected date: 02/14/2025    4. Leukocytosis, unspecified type  -     CBC Auto Differential; Future; Expected date:  02/14/2025    5. Elevated serum creatinine  -     Comprehensive Metabolic Panel; Future; Expected date: 02/14/2025    6. Tenderness of left temporomandibular joint  Comments:  Recommended Tylenol, short course of Ibuprofen   Reach out to Dentist for further management    Follow up in about 3 months (around 2/14/2025) for Diabetes, HTN. In addition to their scheduled follow up, the patient has also been instructed to follow up on as needed basis.

## 2024-11-19 ENCOUNTER — TELEPHONE (OUTPATIENT)
Dept: PRIMARY CARE CLINIC | Facility: CLINIC | Age: 54
End: 2024-11-19
Payer: COMMERCIAL

## 2024-11-19 NOTE — TELEPHONE ENCOUNTER
Called pt and confirmed name and . Informed pt of normal MMG and to repeat in one year. Pt stated thanks and understanding.

## 2025-03-24 ENCOUNTER — RESULTS FOLLOW-UP (OUTPATIENT)
Dept: PRIMARY CARE CLINIC | Facility: CLINIC | Age: 55
End: 2025-03-24

## 2025-03-24 ENCOUNTER — LAB VISIT (OUTPATIENT)
Dept: LAB | Facility: HOSPITAL | Age: 55
End: 2025-03-24
Attending: STUDENT IN AN ORGANIZED HEALTH CARE EDUCATION/TRAINING PROGRAM
Payer: COMMERCIAL

## 2025-03-24 DIAGNOSIS — I10 PRIMARY HYPERTENSION: Chronic | ICD-10-CM

## 2025-03-24 DIAGNOSIS — D72.829 LEUKOCYTOSIS, UNSPECIFIED TYPE: ICD-10-CM

## 2025-03-24 DIAGNOSIS — R79.89 ELEVATED SERUM CREATININE: ICD-10-CM

## 2025-03-24 DIAGNOSIS — E11.43 TYPE II DIABETES MELLITUS WITH PERIPHERAL AUTONOMIC NEUROPATHY: Chronic | ICD-10-CM

## 2025-03-24 LAB
ALBUMIN SERPL-MCNC: 3.8 G/DL (ref 3.5–5)
ALBUMIN/GLOB SERPL: 1.1 RATIO (ref 1.1–2)
ALP SERPL-CCNC: 123 UNIT/L (ref 40–150)
ALT SERPL-CCNC: 15 UNIT/L (ref 0–55)
ANION GAP SERPL CALC-SCNC: 4 MEQ/L
AST SERPL-CCNC: 17 UNIT/L (ref 11–45)
BASOPHILS # BLD AUTO: 0.06 X10(3)/MCL
BASOPHILS NFR BLD AUTO: 0.9 %
BILIRUB SERPL-MCNC: 1.6 MG/DL
BUN SERPL-MCNC: 18.9 MG/DL (ref 9.8–20.1)
CALCIUM SERPL-MCNC: 8.9 MG/DL (ref 8.4–10.2)
CHLORIDE SERPL-SCNC: 111 MMOL/L (ref 98–107)
CO2 SERPL-SCNC: 24 MMOL/L (ref 22–29)
CREAT SERPL-MCNC: 0.95 MG/DL (ref 0.55–1.02)
CREAT/UREA NIT SERPL: 20
EOSINOPHIL # BLD AUTO: 0.29 X10(3)/MCL (ref 0–0.9)
EOSINOPHIL NFR BLD AUTO: 4.2 %
ERYTHROCYTE [DISTWIDTH] IN BLOOD BY AUTOMATED COUNT: 13.8 % (ref 11.5–17)
EST. AVERAGE GLUCOSE BLD GHB EST-MCNC: 108.3 MG/DL
GFR SERPLBLD CREATININE-BSD FMLA CKD-EPI: >60 ML/MIN/1.73/M2
GLOBULIN SER-MCNC: 3.4 GM/DL (ref 2.4–3.5)
GLUCOSE SERPL-MCNC: 111 MG/DL (ref 74–100)
HBA1C MFR BLD: 5.4 %
HCT VFR BLD AUTO: 40 % (ref 37–47)
HGB BLD-MCNC: 13.3 G/DL (ref 12–16)
IMM GRANULOCYTES # BLD AUTO: 0.01 X10(3)/MCL (ref 0–0.04)
IMM GRANULOCYTES NFR BLD AUTO: 0.1 %
LYMPHOCYTES # BLD AUTO: 1.82 X10(3)/MCL (ref 0.6–4.6)
LYMPHOCYTES NFR BLD AUTO: 26.5 %
MCH RBC QN AUTO: 29.7 PG (ref 27–31)
MCHC RBC AUTO-ENTMCNC: 33.3 G/DL (ref 33–36)
MCV RBC AUTO: 89.3 FL (ref 80–94)
MONOCYTES # BLD AUTO: 0.46 X10(3)/MCL (ref 0.1–1.3)
MONOCYTES NFR BLD AUTO: 6.7 %
NEUTROPHILS # BLD AUTO: 4.23 X10(3)/MCL (ref 2.1–9.2)
NEUTROPHILS NFR BLD AUTO: 61.6 %
NRBC BLD AUTO-RTO: 0 %
PLATELET # BLD AUTO: 224 X10(3)/MCL (ref 130–400)
PMV BLD AUTO: 10.2 FL (ref 7.4–10.4)
POTASSIUM SERPL-SCNC: 4 MMOL/L (ref 3.5–5.1)
PROT SERPL-MCNC: 7.2 GM/DL (ref 6.4–8.3)
RBC # BLD AUTO: 4.48 X10(6)/MCL (ref 4.2–5.4)
SODIUM SERPL-SCNC: 139 MMOL/L (ref 136–145)
WBC # BLD AUTO: 6.87 X10(3)/MCL (ref 4.5–11.5)

## 2025-03-24 PROCEDURE — 85025 COMPLETE CBC W/AUTO DIFF WBC: CPT

## 2025-03-24 PROCEDURE — 80053 COMPREHEN METABOLIC PANEL: CPT

## 2025-03-24 PROCEDURE — 83036 HEMOGLOBIN GLYCOSYLATED A1C: CPT

## 2025-03-24 PROCEDURE — 36415 COLL VENOUS BLD VENIPUNCTURE: CPT

## 2025-03-26 ENCOUNTER — OFFICE VISIT (OUTPATIENT)
Dept: PRIMARY CARE CLINIC | Facility: CLINIC | Age: 55
End: 2025-03-26
Payer: COMMERCIAL

## 2025-03-26 DIAGNOSIS — M54.16 LUMBAR BACK PAIN WITH RADICULOPATHY AFFECTING LOWER EXTREMITY: Primary | Chronic | ICD-10-CM

## 2025-03-26 DIAGNOSIS — G89.29 CHRONIC NONINTRACTABLE HEADACHE, UNSPECIFIED HEADACHE TYPE: Chronic | ICD-10-CM

## 2025-03-26 DIAGNOSIS — I10 PRIMARY HYPERTENSION: Chronic | ICD-10-CM

## 2025-03-26 DIAGNOSIS — R51.9 CHRONIC NONINTRACTABLE HEADACHE, UNSPECIFIED HEADACHE TYPE: Chronic | ICD-10-CM

## 2025-03-26 DIAGNOSIS — M51.362 DEGENERATION OF INTERVERTEBRAL DISC OF LUMBAR REGION WITH DISCOGENIC BACK PAIN AND LOWER EXTREMITY PAIN: Chronic | ICD-10-CM

## 2025-03-26 DIAGNOSIS — E11.43 TYPE II DIABETES MELLITUS WITH PERIPHERAL AUTONOMIC NEUROPATHY: Chronic | ICD-10-CM

## 2025-03-26 DIAGNOSIS — I50.9 ACC/AHA STAGE C CONGESTIVE HEART FAILURE: Chronic | ICD-10-CM

## 2025-03-26 DIAGNOSIS — K21.9 GASTROESOPHAGEAL REFLUX DISEASE, UNSPECIFIED WHETHER ESOPHAGITIS PRESENT: Chronic | ICD-10-CM

## 2025-03-26 DIAGNOSIS — M54.12 CERVICAL RADICULOPATHY: Chronic | ICD-10-CM

## 2025-03-26 DIAGNOSIS — Z00.00 WELLNESS EXAMINATION: Chronic | ICD-10-CM

## 2025-03-26 DIAGNOSIS — E78.2 MIXED HYPERLIPIDEMIA: Chronic | ICD-10-CM

## 2025-03-26 PROCEDURE — 1159F MED LIST DOCD IN RCRD: CPT | Mod: CPTII,,, | Performed by: STUDENT IN AN ORGANIZED HEALTH CARE EDUCATION/TRAINING PROGRAM

## 2025-03-26 PROCEDURE — 99214 OFFICE O/P EST MOD 30 MIN: CPT | Mod: ,,, | Performed by: STUDENT IN AN ORGANIZED HEALTH CARE EDUCATION/TRAINING PROGRAM

## 2025-03-26 PROCEDURE — 3044F HG A1C LEVEL LT 7.0%: CPT | Mod: CPTII,,, | Performed by: STUDENT IN AN ORGANIZED HEALTH CARE EDUCATION/TRAINING PROGRAM

## 2025-03-26 PROCEDURE — 4010F ACE/ARB THERAPY RXD/TAKEN: CPT | Mod: CPTII,,, | Performed by: STUDENT IN AN ORGANIZED HEALTH CARE EDUCATION/TRAINING PROGRAM

## 2025-03-26 PROCEDURE — G2211 COMPLEX E/M VISIT ADD ON: HCPCS | Mod: ,,, | Performed by: STUDENT IN AN ORGANIZED HEALTH CARE EDUCATION/TRAINING PROGRAM

## 2025-03-26 PROCEDURE — 1160F RVW MEDS BY RX/DR IN RCRD: CPT | Mod: CPTII,,, | Performed by: STUDENT IN AN ORGANIZED HEALTH CARE EDUCATION/TRAINING PROGRAM

## 2025-03-26 RX ORDER — BACLOFEN 10 MG/1
20 TABLET ORAL 3 TIMES DAILY PRN
Qty: 90 TABLET | Refills: 3 | Status: SHIPPED | OUTPATIENT
Start: 2025-03-26

## 2025-03-26 RX ORDER — FUROSEMIDE 20 MG/1
20 TABLET ORAL DAILY PRN
Qty: 30 TABLET | Refills: 2 | Status: SHIPPED | OUTPATIENT
Start: 2025-03-26 | End: 2025-03-26

## 2025-03-26 RX ORDER — OLMESARTAN MEDOXOMIL 20 MG/1
20 TABLET ORAL DAILY
Qty: 180 TABLET | Refills: 2 | Status: SHIPPED | OUTPATIENT
Start: 2025-03-26 | End: 2025-03-26

## 2025-03-26 RX ORDER — CARVEDILOL 25 MG/1
25 TABLET ORAL 2 TIMES DAILY WITH MEALS
Qty: 60 TABLET | Refills: 2 | Status: SHIPPED | OUTPATIENT
Start: 2025-03-26 | End: 2025-03-26

## 2025-03-26 RX ORDER — OLMESARTAN MEDOXOMIL 20 MG/1
20 TABLET ORAL DAILY
Qty: 180 TABLET | Refills: 2 | Status: SHIPPED | OUTPATIENT
Start: 2025-03-26

## 2025-03-26 RX ORDER — FUROSEMIDE 20 MG/1
20 TABLET ORAL DAILY PRN
Qty: 30 TABLET | Refills: 2 | Status: SHIPPED | OUTPATIENT
Start: 2025-03-26

## 2025-03-26 RX ORDER — CARVEDILOL 25 MG/1
25 TABLET ORAL 2 TIMES DAILY WITH MEALS
Qty: 60 TABLET | Refills: 2 | Status: SHIPPED | OUTPATIENT
Start: 2025-03-26

## 2025-03-26 RX ORDER — BACLOFEN 10 MG/1
20 TABLET ORAL 3 TIMES DAILY PRN
Qty: 90 TABLET | Refills: 3 | Status: SHIPPED | OUTPATIENT
Start: 2025-03-26 | End: 2025-03-26

## 2025-03-26 RX ORDER — AMLODIPINE BESYLATE 5 MG/1
5 TABLET ORAL DAILY
Qty: 90 TABLET | Refills: 1 | Status: SHIPPED | OUTPATIENT
Start: 2025-03-26 | End: 2025-03-26

## 2025-03-26 RX ORDER — BACLOFEN 10 MG/1
10 TABLET ORAL 3 TIMES DAILY PRN
Qty: 30 TABLET | Refills: 3 | Status: SHIPPED | OUTPATIENT
Start: 2025-03-26 | End: 2025-03-26 | Stop reason: SDUPTHER

## 2025-03-26 RX ORDER — AMLODIPINE BESYLATE 5 MG/1
5 TABLET ORAL DAILY
Qty: 90 TABLET | Refills: 1 | Status: SHIPPED | OUTPATIENT
Start: 2025-03-26

## 2025-03-26 RX ORDER — PANTOPRAZOLE SODIUM 40 MG/1
40 TABLET, DELAYED RELEASE ORAL DAILY PRN
Qty: 90 TABLET | Refills: 3 | Status: SHIPPED | OUTPATIENT
Start: 2025-03-26 | End: 2025-03-26

## 2025-03-26 RX ORDER — PANTOPRAZOLE SODIUM 40 MG/1
40 TABLET, DELAYED RELEASE ORAL DAILY PRN
Qty: 90 TABLET | Refills: 3 | Status: SHIPPED | OUTPATIENT
Start: 2025-03-26 | End: 2026-03-26

## 2025-03-31 PROBLEM — I50.20 HEART FAILURE WITH REDUCED EJECTION FRACTION: Status: ACTIVE | Noted: 2025-03-31

## 2025-03-31 PROBLEM — I50.20 HEART FAILURE WITH REDUCED EJECTION FRACTION: Status: RESOLVED | Noted: 2025-03-31 | Resolved: 2025-03-31

## 2025-03-31 RX ORDER — PREGABALIN 150 MG/1
150 CAPSULE ORAL 2 TIMES DAILY
Qty: 60 CAPSULE | Refills: 6 | Status: SHIPPED | OUTPATIENT
Start: 2025-03-31 | End: 2025-09-29

## 2025-04-01 NOTE — ASSESSMENT & PLAN NOTE
Stable   Euvolemic on today's visit   Non-ischemic Cardiomyopathy, [facility] negative in 2021  Echocardiogram in 2021 showed EF of 30-35%; improved to 55% echocardiogram 2022  Continue Olmesartan, Coreg, Lasix  Defer management to Cardiology  Weigh self daily - if 2-3lbs in 24hrs or 5lbs in 1 week, take extra dose of lasix

## 2025-04-01 NOTE — PROGRESS NOTES
Subjective:       Patient ID: Kirstie Christianson is a 54 y.o. female.    -------------------------------------    CHF (NYHA class III, ACC/AHA Stage C)    Diabetes Mellitus II    Disorder of kidney and ureter    Heart failure with reduced ejection fraction    Hilar lymphadenopathy    Obesity, unspecified    Pulmonary nodule    Systolic heart failure      Chief Complaint: Follow-up, Diabetes, and Hypertension    History of Present Illness    CHIEF COMPLAINT:  Patient presents today for follow up of hypertension and diabetes    DIABETES:  She reports side effects from her injectable diabetes medication including GI problems and fatigue occurring the day after injection. These symptoms typically resolve by the second day post-injection. She reports having a sweet tooth with fluctuating food cravings, either strongly desiring certain items or having no appetite for them. Her taste preferences can change mid-meal.    HYPERTENSION:  She reports being out of Amlodipine with only two pills remaining in pill box.    BACK PAIN:  She reports pain in the center and lower back. She has history of seeing a back specialist years ago with no surgical intervention. She has history of breast reduction surgery which has contributed to her back problems due to previously having large breasts. She currently takes Baclofen as muscle relaxer and is inquiring about stronger alternatives.    CURRENT MEDICATIONS:  She continues Fluoxetine for anxiety and depression, Pantoprazole for reflux (with associated belching), and fish oil supplements.    LABS:  Recent labs showed normal kidney and liver function tests, normal complete blood count including white blood cell count, RBC count, hemoglobin, and hematocrit. Chloride was slightly elevated.        Review of Systems   Constitutional:  Negative for chills and fever.   Respiratory:  Negative for cough and shortness of breath.    Cardiovascular:  Negative for chest pain and leg swelling.    Gastrointestinal:  Negative for abdominal pain and vomiting.   Genitourinary:  Negative for dysuria and hematuria.   Musculoskeletal:  Positive for arthralgias and back pain.   Psychiatric/Behavioral:  Negative for dysphoric mood.          Objective:      Physical Exam  Vitals and nursing note reviewed.   Constitutional:       General: She is not in acute distress.     Appearance: Normal appearance. She is not ill-appearing.   HENT:      Head: Normocephalic.      Nose: Nose normal. No rhinorrhea.      Mouth/Throat:      Mouth: Mucous membranes are moist.   Eyes:      General: No scleral icterus.     Conjunctiva/sclera: Conjunctivae normal.   Cardiovascular:      Rate and Rhythm: Normal rate and regular rhythm.      Pulses: Normal pulses.      Heart sounds: Normal heart sounds. No murmur heard.  Pulmonary:      Effort: Pulmonary effort is normal. No respiratory distress.      Breath sounds: Normal breath sounds. No wheezing.   Abdominal:      Palpations: Abdomen is soft.      Tenderness: There is no abdominal tenderness.   Musculoskeletal:      Right lower leg: No edema.      Left lower leg: No edema.   Skin:     General: Skin is warm and dry.      Coloration: Skin is not jaundiced.   Neurological:      Mental Status: She is alert and oriented to person, place, and time. Mental status is at baseline.      Cranial Nerves: No cranial nerve deficit.      Gait: Gait normal.   Psychiatric:         Mood and Affect: Mood normal.         Behavior: Behavior normal.         Assessment & Plan:   Assessment & Plan    I10 Primary Hypertension  R51.9, G89.29 Chronic non-intractable headache   M54.12 Cervical Radiculopathy   I50.9 ACC/AHA Stage C congestive heart failure   E11.43 Type II diabetes mellitus with peripheral autonomic neuropathy   K21.9 GERD  M54.16 Lumbar back pain with radiculopathy affecting lower extremity   M51.362 Degeneration of intervertebral disc of lumbar region with discogenic back pain and lower extremity  pain   E78.2 Mixed Hyperlipidemia     IMPRESSION:  Maintained current Mounjaro dose of 15 mg as A1C is well-controlled at 5.4%, primarily for glycemic control with weight loss as secondary benefit.  Assessed BP medication adherence as cause for elevated BP reading during visit.  Evaluated effectiveness of current back pain management.  Increased Baclofen to 20 mg 3 times daily as needed for back pain, recommended taking at night at least.  Determined need for thoracic and lumbar spine brace to support back, pending insurance coverage.  Reviewed lab results, noting normal kidney and liver function, slight elevation in chloride levels.    TYPE II DIABETES MELLITUS WITH PERIPHERAL AUTONOMIC NEUROPATHY:  - Continue Mounjaro 15 mg injection as currently prescribed.  - Patient's glycemic control is good with an A1c of 5.4 (target <7).  - Discussed potential dosage reduction to 12.5mg due to good A1c levels and reported side effects, patient would like to hold off at this time   - Recommend lifestyle modifications, including exercising 3-5 times weekly and monitoring calorie intake.    ACC/AHA STAGE C CONGESTIVE HEART FAILURE:  -Stable   -Euvolemic on today's visit   -Non-ischemic Cardiomyopathy, C negative in 2021  -Echocardiogram in 2021 showed EF of 30-35%; improved to 55% echocardiogram 2022  -Continue Olmesartan, Coreg, Lasix  -Defer management to Cardiology  -Weigh self daily - if 2-3lbs in 24hrs or 5lbs in 1 week, take extra dose of lasix     HYPERTENSION:  - Continued Amlodipine for blood pressure control.  - Noted elevated blood pressure during visit due to patient running out of medication.  - Emphasized importance of medication adherence for heart function and fluid management.  - Prescribed three antihypertensive medications, including one for fluid retention.  - Requested blood pressure readings in the next 2 weeks and scheduled a nurse visit to assess if medication adjustments are needed.    LOW BACK  PAIN:  - Ordered lumbar spine brace, pending insurance coverage verification.  - Discussed potential MRI for back evaluation and considered physical therapy, due to financial reasons, patient would like to hold off at this time  - Continue Lyrica   - Increased Baclofen dosage from 10mg to 20mg 3 times daily as needed for back pain and muscle spasms.  - Limit NSAIDs, OK to use Tylenol as needed    ANXIETY AND DEPRESSION:  - Continued fluoxetine for management of both anxiety and depression.    GASTROESOPHAGEAL REFLUX DISEASE (GERD):  - Adjusted Pantoprazole to as-needed basis, to be taken on Mounjaro injection days and the day after when experiencing symptoms.  - Noted patient reports of belching and reflux symptoms, particularly after taking medication.    FLUID AND ELECTROLYTE BALANCE:  - Advised on proper hydration, especially during warmer months, to address elevated chloride levels.    PERSONAL HISTORY:  - Noted patient's history of breast reduction surgery.    Follow up in about 6 months (around 9/26/2025) for Wellness, Lab Results. In addition to their scheduled follow up, the patient has also been instructed to follow up on as needed basis.    This note was generated with the assistance of ambient listening technology. Verbal consent was obtained by the patient and accompanying visitor(s) for the recording of patient appointment to facilitate this note. I attest to having reviewed and edited the generated note for accuracy, though some syntax or spelling errors may persist. Please contact the author of this note for any clarification.

## 2025-04-07 ENCOUNTER — TELEPHONE (OUTPATIENT)
Dept: PRIMARY CARE CLINIC | Facility: CLINIC | Age: 55
End: 2025-04-07
Payer: COMMERCIAL

## 2025-04-07 DIAGNOSIS — I10 PRIMARY HYPERTENSION: Chronic | ICD-10-CM

## 2025-04-07 RX ORDER — OLMESARTAN MEDOXOMIL 20 MG/1
20 TABLET ORAL DAILY
Qty: 90 TABLET | Refills: 3 | Status: SHIPPED | OUTPATIENT
Start: 2025-04-07

## 2025-04-07 NOTE — TELEPHONE ENCOUNTER
Spoke with pt about olmesatan not being received, Pt stated she wanted to cancel BP check on 4/9/25 due to not wanting a false reading. Appt was canceled.Pt stated confusion because the pharmacy dispensed all other medication E-prescribed the same day 3/26/2025.  Pt stated she she received a message from pharmacy about insurance not covering med till after 4/5/25, pt then called after that date and the pharmacy stated it was set to different pharmacy (med was sent to correct pharmacy) I also resent the Olemasartan as 90 and 3R instead of 180 and 3R incase that is why insurance is giving her pharmacy trouble. Pt will call back if pharmacy still wont dispense medications, I advised pt to call her insurance as well to verify the problem Pt refused. Pt verified she will follow-up with us today or tomorrow if problems insist.        ----- Message from Shanika sent at 4/7/2025  8:22 AM CDT -----  .Who Called: Kirstie ChristiansonRefill or New Rx:RefillRX Name and Strength:olmesartan (BENICAR) 20 MG tabletHow is the patient currently taking it? (ex. 1XDay):1x dayIs this a 30 day or 90 day RX:180Local or Mail Order:LocalList of preferred pharmacies on file (remove unneeded):[id number] [address] [place] [address] AT [facility] If different Pharmacy is requested, enter Pharmacy information here including location and phone number: na Ordering Provider:PCPPreferred Method of Contact: Phone CallPatient's Preferred Phone Number on File: [phone] Best Call Back Number, if different:Additional Information: pt states that she did not get medications to [facility]. There is a mix up. She wants nurse to give her call.

## 2025-04-07 NOTE — TELEPHONE ENCOUNTER
----- Message from Shanika sent at 4/7/2025  8:22 AM CDT -----  .Who Called: Kirstie ChristiansonRefill or New Rx:RefillRX Name and Strength:olmesartan (BENICAR) 20 MG tabletHow is the patient currently taking it? (ex. 1XDay):1x dayIs this a 30 day or 90 day RX:180Local or Mail Order:LocalList of preferred pharmacies on file (remove unneeded):[id number] [address] [place] [address] AT [facility] If different Pharmacy is requested, enter Pharmacy information here including location and phone number: na Ordering Provider:PCPPreferred Method of Contact: Phone CallPatient's Preferred Phone Number on File: [phone] Best Call Back Number, if different:Additional Information: pt states that [facility]. There is a mix up. She wants nurse to give her call.

## 2025-07-14 ENCOUNTER — OFFICE VISIT (OUTPATIENT)
Dept: PRIMARY CARE CLINIC | Facility: CLINIC | Age: 55
End: 2025-07-14
Payer: COMMERCIAL

## 2025-07-14 VITALS
HEART RATE: 76 BPM | DIASTOLIC BLOOD PRESSURE: 73 MMHG | HEIGHT: 62 IN | BODY MASS INDEX: 29.26 KG/M2 | OXYGEN SATURATION: 98 % | SYSTOLIC BLOOD PRESSURE: 108 MMHG | WEIGHT: 159 LBS

## 2025-07-14 DIAGNOSIS — I10 PRIMARY HYPERTENSION: Chronic | ICD-10-CM

## 2025-07-14 DIAGNOSIS — M51.362 DEGENERATION OF INTERVERTEBRAL DISC OF LUMBAR REGION WITH DISCOGENIC BACK PAIN AND LOWER EXTREMITY PAIN: Chronic | ICD-10-CM

## 2025-07-14 DIAGNOSIS — M54.16 LUMBAR BACK PAIN WITH RADICULOPATHY AFFECTING LOWER EXTREMITY: Chronic | ICD-10-CM

## 2025-07-14 DIAGNOSIS — E11.43 TYPE II DIABETES MELLITUS WITH PERIPHERAL AUTONOMIC NEUROPATHY: Chronic | ICD-10-CM

## 2025-07-14 DIAGNOSIS — M54.12 CERVICAL RADICULOPATHY: Primary | Chronic | ICD-10-CM

## 2025-07-14 DIAGNOSIS — R43.0 ANOSMIA: ICD-10-CM

## 2025-07-14 PROCEDURE — 1159F MED LIST DOCD IN RCRD: CPT | Mod: CPTII,,, | Performed by: STUDENT IN AN ORGANIZED HEALTH CARE EDUCATION/TRAINING PROGRAM

## 2025-07-14 PROCEDURE — 1160F RVW MEDS BY RX/DR IN RCRD: CPT | Mod: CPTII,,, | Performed by: STUDENT IN AN ORGANIZED HEALTH CARE EDUCATION/TRAINING PROGRAM

## 2025-07-14 PROCEDURE — 3078F DIAST BP <80 MM HG: CPT | Mod: CPTII,,, | Performed by: STUDENT IN AN ORGANIZED HEALTH CARE EDUCATION/TRAINING PROGRAM

## 2025-07-14 PROCEDURE — 3044F HG A1C LEVEL LT 7.0%: CPT | Mod: CPTII,,, | Performed by: STUDENT IN AN ORGANIZED HEALTH CARE EDUCATION/TRAINING PROGRAM

## 2025-07-14 PROCEDURE — 3074F SYST BP LT 130 MM HG: CPT | Mod: CPTII,,, | Performed by: STUDENT IN AN ORGANIZED HEALTH CARE EDUCATION/TRAINING PROGRAM

## 2025-07-14 PROCEDURE — 99214 OFFICE O/P EST MOD 30 MIN: CPT | Mod: ,,, | Performed by: STUDENT IN AN ORGANIZED HEALTH CARE EDUCATION/TRAINING PROGRAM

## 2025-07-14 PROCEDURE — 3008F BODY MASS INDEX DOCD: CPT | Mod: CPTII,,, | Performed by: STUDENT IN AN ORGANIZED HEALTH CARE EDUCATION/TRAINING PROGRAM

## 2025-07-14 PROCEDURE — 4010F ACE/ARB THERAPY RXD/TAKEN: CPT | Mod: CPTII,,, | Performed by: STUDENT IN AN ORGANIZED HEALTH CARE EDUCATION/TRAINING PROGRAM

## 2025-07-14 RX ORDER — FUROSEMIDE 20 MG/1
10 TABLET ORAL DAILY PRN
Qty: 30 TABLET | Refills: 2 | Status: SHIPPED | OUTPATIENT
Start: 2025-07-14 | End: 2026-07-14

## 2025-07-14 RX ORDER — BACLOFEN 10 MG/1
20 TABLET ORAL 3 TIMES DAILY PRN
Qty: 90 TABLET | Refills: 3 | Status: SHIPPED | OUTPATIENT
Start: 2025-07-14

## 2025-07-14 RX ORDER — OLMESARTAN MEDOXOMIL 20 MG/1
10 TABLET ORAL DAILY
Qty: 15 TABLET | Refills: 11 | Status: SHIPPED | OUTPATIENT
Start: 2025-07-14 | End: 2026-07-14

## 2025-07-23 ENCOUNTER — TELEPHONE (OUTPATIENT)
Dept: PRIMARY CARE CLINIC | Facility: CLINIC | Age: 55
End: 2025-07-23
Payer: COMMERCIAL

## 2025-07-28 ENCOUNTER — CLINICAL SUPPORT (OUTPATIENT)
Dept: PRIMARY CARE CLINIC | Facility: CLINIC | Age: 55
End: 2025-07-28
Payer: COMMERCIAL

## 2025-07-28 DIAGNOSIS — I10 PRIMARY HYPERTENSION: Primary | Chronic | ICD-10-CM

## 2025-07-28 PROCEDURE — 99499 UNLISTED E&M SERVICE: CPT | Mod: ,,, | Performed by: STUDENT IN AN ORGANIZED HEALTH CARE EDUCATION/TRAINING PROGRAM

## 2025-07-28 NOTE — PROGRESS NOTES
Pt present today for blood pressure check.   Came with home log.   No other complaints.   Pt reports taking medications as prescribed.   Readings in office today: 141/96 (LA) Pulse 75, 134/88 (RA) Pulse 72      Pt educated on the importance of taking medications as prescribed, taking blood pressures at home, keeping all scheduled appointments.   Pt verbalized understanding.       Pt's home BP cuff was not placed correctly on her arm. She had the cuff covering her elbow. Educated pt on correct BP cuff placement. Advised pt to track BP for 1 more week and drop readings at PCP's office.

## 2025-08-31 PROBLEM — R43.0 ANOSMIA: Status: ACTIVE | Noted: 2025-08-31
